# Patient Record
Sex: MALE | Race: WHITE | Employment: FULL TIME | URBAN - METROPOLITAN AREA
[De-identification: names, ages, dates, MRNs, and addresses within clinical notes are randomized per-mention and may not be internally consistent; named-entity substitution may affect disease eponyms.]

---

## 2018-04-18 ENCOUNTER — RECORDS - HEALTHEAST (OUTPATIENT)
Dept: LAB | Facility: CLINIC | Age: 28
End: 2018-04-18

## 2018-04-19 LAB — TSH SERPL DL<=0.005 MIU/L-ACNC: 1.96 UIU/ML (ref 0.3–5)

## 2019-06-12 ENCOUNTER — HOSPITAL ENCOUNTER (INPATIENT)
Facility: CLINIC | Age: 29
LOS: 11 days | Discharge: HOME | DRG: 885 | End: 2019-06-24
Attending: PSYCHIATRY & NEUROLOGY | Admitting: PSYCHIATRY & NEUROLOGY
Payer: COMMERCIAL

## 2019-06-12 DIAGNOSIS — F33.2 MDD (MAJOR DEPRESSIVE DISORDER), RECURRENT SEVERE, WITHOUT PSYCHOSIS (H): ICD-10-CM

## 2019-06-12 DIAGNOSIS — F31.81 BIPOLAR 2 DISORDER (H): Primary | ICD-10-CM

## 2019-06-12 DIAGNOSIS — R45.851 SUICIDAL IDEATION: ICD-10-CM

## 2019-06-12 DIAGNOSIS — F17.200 NICOTINE USE DISORDER: ICD-10-CM

## 2019-06-12 DIAGNOSIS — F10.10 ALCOHOL ABUSE, EPISODIC DRINKING BEHAVIOR: ICD-10-CM

## 2019-06-12 LAB
AMPHETAMINES UR QL SCN: NEGATIVE
BARBITURATES UR QL: NEGATIVE
BENZODIAZ UR QL: NEGATIVE
CANNABINOIDS UR QL SCN: POSITIVE
COCAINE UR QL: NEGATIVE
ETHANOL UR QL SCN: NEGATIVE
OPIATES UR QL SCN: NEGATIVE

## 2019-06-12 PROCEDURE — 80320 DRUG SCREEN QUANTALCOHOLS: CPT | Performed by: FAMILY MEDICINE

## 2019-06-12 PROCEDURE — 99285 EMERGENCY DEPT VISIT HI MDM: CPT | Mod: 25 | Performed by: PSYCHIATRY & NEUROLOGY

## 2019-06-12 PROCEDURE — 99285 EMERGENCY DEPT VISIT HI MDM: CPT | Mod: Z6 | Performed by: PSYCHIATRY & NEUROLOGY

## 2019-06-12 PROCEDURE — 80307 DRUG TEST PRSMV CHEM ANLYZR: CPT | Performed by: FAMILY MEDICINE

## 2019-06-12 ASSESSMENT — ENCOUNTER SYMPTOMS
RESPIRATORY NEGATIVE: 1
CARDIOVASCULAR NEGATIVE: 1
ENDOCRINE NEGATIVE: 1
GASTROINTESTINAL NEGATIVE: 1
DECREASED CONCENTRATION: 1
MUSCULOSKELETAL NEGATIVE: 1
NEUROLOGICAL NEGATIVE: 1
HALLUCINATIONS: 0
EYES NEGATIVE: 1
NERVOUS/ANXIOUS: 1
ACTIVITY CHANGE: 1
HEMATOLOGIC/LYMPHATIC NEGATIVE: 1

## 2019-06-12 ASSESSMENT — MIFFLIN-ST. JEOR: SCORE: 1768.2

## 2019-06-13 PROBLEM — F32.A DEPRESSION WITH SUICIDAL IDEATION: Status: ACTIVE | Noted: 2019-06-13

## 2019-06-13 PROBLEM — R45.851 DEPRESSION WITH SUICIDAL IDEATION: Status: ACTIVE | Noted: 2019-06-13

## 2019-06-13 PROCEDURE — 25000132 ZZH RX MED GY IP 250 OP 250 PS 637: Performed by: PSYCHIATRY & NEUROLOGY

## 2019-06-13 PROCEDURE — 12400001 ZZH R&B MH UMMC

## 2019-06-13 PROCEDURE — H2032 ACTIVITY THERAPY, PER 15 MIN: HCPCS

## 2019-06-13 PROCEDURE — 99223 1ST HOSP IP/OBS HIGH 75: CPT | Mod: AI | Performed by: PSYCHIATRY & NEUROLOGY

## 2019-06-13 RX ORDER — TRAZODONE HYDROCHLORIDE 50 MG/1
50 TABLET, FILM COATED ORAL
Status: DISCONTINUED | OUTPATIENT
Start: 2019-06-13 | End: 2019-06-24 | Stop reason: HOSPADM

## 2019-06-13 RX ORDER — HYDROXYZINE HYDROCHLORIDE 25 MG/1
25 TABLET, FILM COATED ORAL EVERY 4 HOURS PRN
Status: DISCONTINUED | OUTPATIENT
Start: 2019-06-13 | End: 2019-06-24 | Stop reason: HOSPADM

## 2019-06-13 RX ORDER — POLYETHYLENE GLYCOL 3350 17 G
2-4 POWDER IN PACKET (EA) ORAL
Status: DISCONTINUED | OUTPATIENT
Start: 2019-06-13 | End: 2019-06-24 | Stop reason: HOSPADM

## 2019-06-13 RX ORDER — ALUMINA, MAGNESIA, AND SIMETHICONE 2400; 2400; 240 MG/30ML; MG/30ML; MG/30ML
30 SUSPENSION ORAL EVERY 4 HOURS PRN
Status: DISCONTINUED | OUTPATIENT
Start: 2019-06-13 | End: 2019-06-24 | Stop reason: HOSPADM

## 2019-06-13 RX ORDER — LANOLIN ALCOHOL/MO/W.PET/CERES
3 CREAM (GRAM) TOPICAL AT BEDTIME
Status: DISCONTINUED | OUTPATIENT
Start: 2019-06-13 | End: 2019-06-24 | Stop reason: HOSPADM

## 2019-06-13 RX ORDER — OLANZAPINE 10 MG/1
10 TABLET ORAL
Status: DISCONTINUED | OUTPATIENT
Start: 2019-06-13 | End: 2019-06-24 | Stop reason: HOSPADM

## 2019-06-13 RX ORDER — VENLAFAXINE HYDROCHLORIDE 37.5 MG/1
37.5 TABLET, EXTENDED RELEASE ORAL
Status: DISCONTINUED | OUTPATIENT
Start: 2019-06-13 | End: 2019-06-17

## 2019-06-13 RX ORDER — LAMOTRIGINE 25 MG/1
25 TABLET ORAL EVERY EVENING
Status: DISCONTINUED | OUTPATIENT
Start: 2019-06-13 | End: 2019-06-17

## 2019-06-13 RX ORDER — ACETAMINOPHEN 325 MG/1
650 TABLET ORAL EVERY 4 HOURS PRN
Status: DISCONTINUED | OUTPATIENT
Start: 2019-06-13 | End: 2019-06-24 | Stop reason: HOSPADM

## 2019-06-13 RX ORDER — BISACODYL 10 MG
10 SUPPOSITORY, RECTAL RECTAL DAILY PRN
Status: DISCONTINUED | OUTPATIENT
Start: 2019-06-13 | End: 2019-06-24 | Stop reason: HOSPADM

## 2019-06-13 RX ORDER — OLANZAPINE 10 MG/2ML
10 INJECTION, POWDER, FOR SOLUTION INTRAMUSCULAR
Status: DISCONTINUED | OUTPATIENT
Start: 2019-06-13 | End: 2019-06-24 | Stop reason: HOSPADM

## 2019-06-13 RX ADMIN — VENLAFAXINE HYDROCHLORIDE 37.5 MG: 37.5 TABLET, EXTENDED RELEASE ORAL at 13:09

## 2019-06-13 RX ADMIN — HYDROXYZINE HYDROCHLORIDE 25 MG: 25 TABLET ORAL at 09:50

## 2019-06-13 RX ADMIN — NICOTINE POLACRILEX 2 MG: 2 LOZENGE ORAL at 13:08

## 2019-06-13 RX ADMIN — NICOTINE POLACRILEX 4 MG: 2 LOZENGE ORAL at 09:35

## 2019-06-13 RX ADMIN — LAMOTRIGINE 25 MG: 25 TABLET ORAL at 21:19

## 2019-06-13 RX ADMIN — NICOTINE POLACRILEX 4 MG: 2 LOZENGE ORAL at 21:19

## 2019-06-13 RX ADMIN — MELATONIN TAB 3 MG 3 MG: 3 TAB at 21:19

## 2019-06-13 RX ADMIN — NICOTINE POLACRILEX 4 MG: 2 LOZENGE ORAL at 18:11

## 2019-06-13 RX ADMIN — NICOTINE POLACRILEX 2 MG: 2 LOZENGE ORAL at 16:05

## 2019-06-13 ASSESSMENT — ACTIVITIES OF DAILY LIVING (ADL)
DRESS: INDEPENDENT;SCRUBS (BEHAVIORAL HEALTH)
HYGIENE/GROOMING: INDEPENDENT
HYGIENE/GROOMING: INDEPENDENT
BATHING: 0-->INDEPENDENT
AMBULATION: 0-->INDEPENDENT
LAUNDRY: WITH SUPERVISION
TOILETING: 0-->INDEPENDENT
RETIRED_COMMUNICATION: 0-->UNDERSTANDS/COMMUNICATES WITHOUT DIFFICULTY
FALL_HISTORY_WITHIN_LAST_SIX_MONTHS: NO
LAUNDRY: WITH SUPERVISION
DRESS: INDEPENDENT;SCRUBS (BEHAVIORAL HEALTH)
TRANSFERRING: 0-->INDEPENDENT
DRESS: 0-->INDEPENDENT
SWALLOWING: 0-->SWALLOWS FOODS/LIQUIDS WITHOUT DIFFICULTY
ORAL_HYGIENE: INDEPENDENT
COGNITION: 0 - NO COGNITION ISSUES REPORTED
RETIRED_EATING: 0-->INDEPENDENT
ORAL_HYGIENE: INDEPENDENT

## 2019-06-13 ASSESSMENT — MIFFLIN-ST. JEOR
SCORE: 1737.81
SCORE: 1748.24

## 2019-06-13 NOTE — PROGRESS NOTES
Initial Psychosocial Assessment    I have reviewed the chart, met with the patient, and developed Care Plan.     Presenting Problem:  The patient is a 29 year-old with depression who had been reportedly weaving in and out of traffic afraid he would kill himself.  Hx depression since teens and reports a few manic episodes within the last year and racing thoughts the last week. Feeling worthless with poor self esteem but has fairly good insight into what he needs.  Brought to ED by a friend. Six to eight months ago, patient reached out to a physician stating he thought he was Bipolar and that physician put him on a mood stabilizer.  He then became seriously constipated, had colonoscopy and it was decided that Gabapentin may have caused it so stopped it.  Patient has had no psychiatric medications since. Patient is voluntary admission.    Patient has been resting in his room.  Patient reported that he spoke with his employer HR rep so they do know he is hospitalized.  His goal is to get the correct diagnosis and medications to stabilize him.    History of Mental Health and Chemical Dependency:  Smokes THC and binge drinks on occasion but no other illicit drug abuse. History of suicide attempt at age 16.  First psychiatric hospitalization and new patient to [facility]    Family Description (Constellation, Family Psychiatric History):  [place]ent born in [place] and grew up in [place], [place] with his Mom and Stepfather who still live in family home.  Patient has a stepsister María Elena and biological sister Beth who are both older. He is currently single, never  and has no children of issue.      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    Financial Status:  Wages but has financial concerns.    Legal Issues:  None    Ethnic/Cultural Issues:  None    Spiritual Orientation:  Undesignated     Service History:  None    Social Functioning (organization, interests):  Socializing with friends.    Current Treatment Providers are:  No current providers but wants a new psychiatrist in the [place] area.    Social Service Assessment/Plan:  The patient needs a psychiatric assessment and stabilization of his mood and other symptoms.  He will be encouraged to come to staff with any questions or concerns.  He will be encouraged to attend all of the unit programming.  Patient lives in [place] and will need psychiatric provider in [facility] since he works in [employer] and does not want to return to the [facility] system. CTC to ensure that he has a comprehensive care plan in place at discharge.

## 2019-06-13 NOTE — ED NOTES
"\"I have performed an in person assessment of the patient.  Based on this assessment the patient no longer requires a one on one attendant at this point in time.\"       Signed:  Jennie Cortez MD  June 12, 2019 at 9:44 PM       Jennie Cortez MD  06/12/19 2145    "

## 2019-06-13 NOTE — PROGRESS NOTES
"CLINICAL NUTRITION SERVICES - ASSESSMENT NOTE     Nutrition Prescription    RECOMMENDATIONS FOR MDs/PROVIDERS TO ORDER:  None at this time     Malnutrition Status:    Patient does not meet two of the above criteria necessary for diagnosing malnutrition    Recommendations already ordered by Registered Dietitian (RD):  None at this time    Future/Additional Recommendations:  Monitor PO and wt trends     REASON FOR ASSESSMENT  Mathew Lainez is a/an 29 year old male assessed by the dietitian for Admission Nutrition Risk Screen for unintentional loss of 10# or more in the past two months    NUTRITION HISTORY  Mathew reports not being super hungry PTA.   Usually eats 6 small meals. Says he works nights so his eating schedule is irregular. Reports having been a  in the past and can cook well. Reports that the past few months he was exercising more (training for a marathon) and eating healthier food, had intentional ~30 lb weight loss due to these lifestyle changes.   B: oatmeal  Other food: chicken, shrimp, veggies, stir lopez    PMH: Constipation (improved once pt went off medications), episodic alcohol binges, suicidal thoughts    CURRENT NUTRITION ORDERS  Diet: Regular  Intake/Tolerance: Pt reports an ok appetite that is inconsistent     LABS  Labs reviewed    MEDICATIONS  Medications reviewed  Magnesium hydroxide     ANTHROPOMETRICS  Height: 180.3 cm (5' 11\")  Most Recent Weight: 77.1 kg (170 lb)    IBW: 78.2 kg (98%)   BMI: Normal BMI  Weight History: Pt reports intentional weight loss due to lifestyle changes   OSH  6/2/2019- 80 kg  2/20/2018- 83.4 kg     Dosing Weight: 77 kg    ASSESSED NUTRITION NEEDS  Estimated Energy Needs: 8543-0615 kcals/day (25 - 30 kcals/kg)  Justification: Maintenance  Estimated Protein Needs: 62-77 grams protein/day (0.8 - 1 grams of pro/kg)  Justification: Maintenance  Estimated Fluid Needs: (1 mL/kcal)   Justification: Maintenance    PHYSICAL FINDINGS  See malnutrition section " below.    MALNUTRITION  % Intake: Decreased intake does not meet criteria- variable intake   % Weight Loss: Weight loss does not meet criteria- intentional weight loss  Subcutaneous Fat Loss: None observed  Muscle Loss: None observed  Fluid Accumulation/Edema: None noted  Malnutrition Diagnosis: Patient does not meet two of the above criteria necessary for diagnosing malnutrition    NUTRITION DIAGNOSIS  Predicted inadequate nutrient intake (calorie/protein) related to current variable appetite and intake as evidenced by potential for decline.       INTERVENTIONS  Implementation  Nutrition Education: Discussed current appetite and intake. Encouraged intake of meals TID.     Goals  Patient to consume % of nutritionally adequate meal trays TID, or the equivalent with supplements/snacks.     Monitoring/Evaluation  Progress toward goals will be monitored and evaluated per protocol.    Denisse Navarro, Nutrition   Unit pager: [phone]

## 2019-06-13 NOTE — H&P
"[facility], [place]   Psychiatric History & Physical  Admission date: 6/12/2019        Chief Complaint:   \"I've been feeling worse, and noticing it affecting my work         HPI:     Mathew is a 29 year old male with history of depressive mood, alcohol and cannabis use who is admitted on a voluntary basis for worsening depressive moods with suicidal ideation. The patient talks openly about his symptoms, and describes ongoing feelings of sustained low moods, worthlessness, self critical thoughts, apathy, anergy and worsening suicidal ideation. Reports indicate that the patient had reportedly was weaving in and out of traffic and was worried that he was going to kill himself, which lead him to get inpatient help for his symptoms. The patient works, but has found it difficult to function effectively with poor focus and attention, and recurrent ruminations on negative self thoughts. He also has experienced times where he is uncharacteristically elevated in his thoughts, along with episodes of impulsive behavior, with fast moving thoughts. He has tried psychotropic medications in the past, but lead to unwanted side effects (sexual side effects and constipation apparently). He admits to going on episodic alcohol binges that last a day or two along with using cannabis daily to help him find some temporary relief from his symptoms. He currently is open to medications for his symptoms.         Past Psychiatric History:   Past inpatient treatment: No prior hospitalization history.   Past medications: Gabapentin (has intense drowsiness), Prozac (didn't help depression and had sexual side effects), and Wellbutrin (not sure if it helped him much).   Current outpatient psychiatrist: None  Current outpatient therapist: None   Other (ACT team etc): None  Past suicide attempts: Denies  History of commitments: None  History of ECT: None         Substance Use and History:     Patient uses cannabis about 5-6 " "times per week, sometimes daily. Occasionally uses alcohl 1-2 times per week. Has a DUI in 2013. Used to go to AA meetings in the past.         Past Medical History:   PAST MEDICAL HISTORY:   Past Medical History:   Diagnosis Date     Anemia      Bipolar 1 disorder (H)        PAST SURGICAL HISTORY:   Past Surgical History:   Procedure Laterality Date     HC CREATE EARDRUM OPENING,GEN ANESTH  1998     HC REMOVAL ADENOIDS,SECOND,<11 Y/O  1998     HC REMOVAL OF TONSILS,<11 Y/O  1990             Family History:   FAMILY HISTORY:   Family History   Problem Relation Age of Onset     C.A.D. Maternal Grandfather         MI     Cancer Sister         brain cancer           Social History:   SOCIAL HISTORY:   Social History     Tobacco Use     Smoking status: Current Every Day Smoker     Packs/day: 2.00     Smokeless tobacco: Never Used   Substance Use Topics     Alcohol use: Yes     Comment: rare            Physical ROS:   The patient's 10-point review of systems was negative except as noted in HPI.         PTA Medications:     No medications prior to admission.          Allergies:     Allergies   Allergen Reactions     No Known Drug Allergies           Labs:     Recent Results (from the past 48 hour(s))   Drug abuse screen 6 urine (tox)    Collection Time: 06/12/19  8:14 PM   Result Value Ref Range    Amphetamine Qual Urine Negative NEG^Negative    Barbiturates Qual Urine Negative NEG^Negative    Benzodiazepine Qual Urine Negative NEG^Negative    Cannabinoids Qual Urine Positive (A) NEG^Negative    Cocaine Qual Urine Negative NEG^Negative    Ethanol Qual Urine Negative NEG^Negative    Opiates Qualitative Urine Negative NEG^Negative          Physical and Psychiatric Examination:     /68   Pulse 55   Temp 96.6  F (35.9  C) (Tympanic)   Resp 16   Ht 1.803 m (5' 11\")   Wt 76.1 kg (167 lb 11.2 oz)   SpO2 98%   BMI 23.39 kg/m    Weight is 167 lbs 11.2 oz  Body mass index is 23.39 kg/m .    Physical Exam:  I have " "reviewed the physical exam as documented by ED provider and agree with findings and assessment and have no additional findings to add at this time.    Mental Status Exam:  Appearance: White male. Moderately well groomed. In hospital scrubs. No acute distress.   Attitude:  cooperative  Eye Contact:  poor   Mood:  depressed  Affect:  intensity is blunted  Speech:  clear, coherent  Language: fluent and intact in English  Psychomotor, Gait, Musculoskeletal:  no evidence of tardive dyskinesia, dystonia, or tics  Throught Process:  logical, linear and goal oriented  Associations:  no loose associations  Thought Content:  no evidence of suicidal ideation or homicidal ideation, no evidence of psychotic thought, no auditory hallucinations present and no visual hallucinations present  Insight:  fair  Judgement:  fair  Oriented to:  time, person, and place  Attention Span and Concentration:  fair  Recent and Remote Memory:  fair  Fund of Knowledge:  appropriate         Admission Diagnoses:      MDD (major depressive disorder), recurrent severe, without psychosis vs. Bipolar 2 Disorder   Cannabis Use Disorder  R/o Alcohol Use Disorder          Assessment & Plan:     Assessment:  Patient appears to have worsening depressive symptoms with suicidality. He has not been on psychotropic medications and has been \"self medicating\" with frequent cannabis and occasional episodic binges of alcohol use. Along with depression, he also seems to have some symptoms of hypomania, with depression being the main component (with anergy, apathy, avolition, and negative self ruminations being primary symptoms). We discussed medications to help with some of these symptoms, including Effexor and Lamictal, both which he was open to take.      Plan:  1.) Medication Plan:  - Effexor 37.5 mg   - Lamictal 25 mg  - Melatonin 3 mg HS    2.) Psychosocial Plan:  - Patient could benefit from individual therapy. Will also discuss partial/day program options. "     Legal:  Voluntary     Disposition:  Likely discharge in 2-3 days        Jagdeep Soto MD  [facility] Psychiatry

## 2019-06-13 NOTE — ED NOTES
"ED to Behavioral Floor Handoff    SITUATION  Mathew Lainez is a 29 year old male who speaks English and lives in a home alone The patient arrived in the ED by private car from home with a complaint of Suicidal (suicidal thoughts and started arranging finacial plan for his friend. Plan to cut his wrist or OD on Ibuprofen and vodka. Pt stopped taking his meds about 6 months ago)  .The patient's current symptoms started/worsened 1 week(s) ago and during this time the symptoms have increased.   In the ED, pt was diagnosed with   Final diagnoses:   MDD (major depressive disorder), recurrent severe, without psychosis (H)   Suicidal ideation   Alcohol abuse, episodic drinking behavior        Initial vitals were: BP: 145/80  Pulse: 103  Temp: 98.2  F (36.8  C)  Height: 180.3 cm (5' 11\")  Weight: 79.1 kg (174 lb 6.4 oz)  SpO2: 96 %   --------  Is the patient diabetic? No   If yes, last blood glucose? --     If yes, was this treated in the ED? --  --------  Is the patient inebriated (ETOH) No or Impaired on other substances? No  MSSA done? N/A  Last MSSA score: --    Were withdrawal symptoms treated? N/A  Does the patient have a seizure history? No. If yes, date of most recent seizure--  --------  Is the patient patient experiencing suicidal ideation? reports the following suicide factors: thoughts and plan to OD or drive car into traffic    Homicidal ideation? denies current or recent homicidal ideation or behaviors.    Self-injurious behavior/urges? denies current or recent self injurious behavior or ideation.  ------  Was pt aggressive in the ED No  Was a code called No  Is the pt now cooperative? Yes  -------  Meds given in ED: Medications - No data to display   Family present during ED course? No  Family currently present? No    BACKGROUND  Does the patient have a cognitive impairment or developmental disability? No  Allergies:   Allergies   Allergen Reactions     No Known Drug Allergies    .   Social demographics " "are   Social History     Socioeconomic History     Marital status: Single     Spouse name: None     Number of children: None     Years of education: None     Highest education level: None   Occupational History     None   Social Needs     Financial resource strain: None     Food insecurity:     Worry: None     Inability: None     Transportation needs:     Medical: None     Non-medical: None   Tobacco Use     Smoking status: Current Every Day Smoker     Packs/day: 2.00     Smokeless tobacco: Never Used   Substance and Sexual Activity     Alcohol use: Yes     Comment: rare     Drug use: Yes     Types: Marijuana     Sexual activity: None   Lifestyle     Physical activity:     Days per week: None     Minutes per session: None     Stress: None   Relationships     Social connections:     Talks on phone: None     Gets together: None     Attends Hindu service: None     Active member of club or organization: None     Attends meetings of clubs or organizations: None     Relationship status: None     Intimate partner violence:     Fear of current or ex partner: None     Emotionally abused: None     Physically abused: None     Forced sexual activity: None   Other Topics Concern     None   Social History Narrative     None        ASSESSMENT  Labs results   Labs Ordered and Resulted from Time of ED Arrival Up to the Time of Departure from the ED   DRUG ABUSE SCREEN 6 CHEM DEP URINE ([facility]) - Abnormal; Notable for the following components:       Result Value    Cannabinoids Qual Urine Positive (*)     All other components within normal limits      Imaging Studies: No results found for this or any previous visit (from the past 24 hour(s)).   Most recent vital signs /80   Pulse 103   Temp 98.2  F (36.8  C)   Ht 1.803 m (5' 11\")   Wt 79.1 kg (174 lb 6.4 oz)   SpO2 96%   BMI 24.32 kg/m     Abnormal labs/tests/findings requiring intervention:---   Pain control: pt had none  Nausea control: pt had " none    RECOMMENDATION  Are any infection precautions needed (MRSA, VRE, etc.)? No If yes, what infection? --  ---  Does the patient have mobility issues? independently. If yes, what device does the pt use? ---  ---  Is patient on 72 hour hold or commitment? No If on 72 hour hold, have hold and rights been given to patient? No  Are admitting orders written if after 10 p.m. ?N/A  Tasks needing to be completed:---     Marge yoon-- [phone] [facility]   [phone] [facility]

## 2019-06-13 NOTE — PROGRESS NOTES
06/13/19 0056   Patient Belongings   Did you bring any home meds/supplements to the hospital?  No   Patient Belongings locker;sent to security per site process   Patient Belongings Put in Hospital Secure Location (Security or Locker, etc.) other (see comments)   Belongings Search Yes   Clothing Search Yes   Second Staff FORREST     In locker:  Shirt  Pair of pants  Phone  Sweater  Pair of shoes    Set of keys  Pair of ear buds  Wallet  Pair of underwear    To security:  Isaiah Mora - [phone]  $1 cash    A               Admission:  I am responsible for any personal items that are not sent to the safe or pharmacy.  [facility] is not responsible for loss, theft or damage of any property in my possession.    Signature:  _________________________________ Date: _______  Time: _____                                              Staff Signature:  ____________________________ Date: ________  Time: _____      2nd Staff person, if patient is unable/unwilling to sign:    Signature: ________________________________ Date: ________  Time: _____     Discharge:  [facility] has returned all of my personal belongings:    Signature: _________________________________ Date: ________  Time: _____                                          Staff Signature:  ____________________________ Date: ________  Time: _____

## 2019-06-13 NOTE — ED PROVIDER NOTES
History     Chief Complaint   Patient presents with     Suicidal     suicidal thoughts and started arranging finacial plan for his friend. Plan to cut his wrist or OD on Ibuprofen and vodka. Pt stopped taking his meds about 6 months ago     HPI  Mathew Lainez is a 29 year old male who is here brought by a friend whom he called as he was feeling suicidal and scared that he will kill himself by swerving into incoming traffic. Patient reports a long history of depression. He saw a specialist who felt he may be bipolar and started him on mood stabilizers. Patient tried the meds for couple years but did not see any benefit. He was battling horrible constipation and had multiple work-ups with colonoscopies and no medical etiology could be found. They felt it was medication related, likely due to gabapentin. Patient decided to go off his meds. His constipation improved. He has not been on any psychotropics for 6-8 months. He reports feeling more depressed past couple months. Patient continues to work. He shares an apartment with a roommate in [place]. He admits to going on episodic alcohol binges. He last used 1 week ago. He occasionally smokes THC. He denies using other drugs. He has no acute medical concerns. He had driven from work in [employer] to [place] today. He was visiting the [place] when he felt extremely depressed and had urges to drive into oncoming traffic. He was afraid that other would get hurt and decided to call his friend for help. He continues to feel unsafe. He has not been hospitalized previously. He would like to voluntarily check himself in.    There is family history of suicide with grandmother and great uncle.    PERSONAL MEDICAL HISTORY  Past Medical History:   Diagnosis Date     Anemia      Bipolar 1 disorder (H)      PAST SURGICAL HISTORY  Past Surgical History:   Procedure Laterality Date     HC CREATE EARDRUM OPENING,GEN ANESTH  1998     HC REMOVAL ADENOIDS,SECOND,<13 Y/O  1998  "    HC REMOVAL OF TONSILS,<11 Y/O  1990     FAMILY HISTORY  Family History   Problem Relation Age of Onset     C.A.D. Maternal Grandfather         MI     Cancer Sister         brain cancer     SOCIAL HISTORY  Social History     Tobacco Use     Smoking status: Current Every Day Smoker     Packs/day: 2.00     Smokeless tobacco: Never Used   Substance Use Topics     Alcohol use: Yes     Comment: rare     MEDICATIONS  No current facility-administered medications for this encounter.      No current outpatient medications on file.     ALLERGIES  Allergies   Allergen Reactions     No Known Drug Allergies          I have reviewed the Medications, Allergies, Past Medical and Surgical History, and Social History in the Epic system.    Review of Systems   Constitutional: Positive for activity change.   HENT: Negative.    Eyes: Negative.    Respiratory: Negative.    Cardiovascular: Negative.    Gastrointestinal: Negative.    Endocrine: Negative.    Genitourinary: Negative.    Musculoskeletal: Negative.    Skin: Negative.    Neurological: Negative.    Hematological: Negative.    Psychiatric/Behavioral: Positive for decreased concentration and suicidal ideas. Negative for hallucinations. The patient is nervous/anxious.    All other systems reviewed and are negative.      Physical Exam   BP: 145/80  Pulse: 103  Temp: 98.2  F (36.8  C)  Height: 180.3 cm (5' 11\")  Weight: 79.1 kg (174 lb 6.4 oz)  SpO2: 96 %      Physical Exam   Constitutional: He appears well-developed and well-nourished.   HENT:   Head: Normocephalic.   Eyes: Pupils are equal, round, and reactive to light.   Neck: Normal range of motion.   Cardiovascular: Normal rate.   Pulmonary/Chest: Effort normal.   Abdominal: Soft.   Musculoskeletal: Normal range of motion.   Neurological: He is alert.   Skin: Skin is warm.   Psychiatric: His speech is normal and behavior is normal. Judgment normal. His mood appears anxious. He is not actively hallucinating. Thought content is " not paranoid and not delusional. Cognition and memory are normal. He exhibits a depressed mood. He expresses suicidal ideation. He expresses no homicidal ideation. He expresses suicidal plans.   Nursing note and vitals reviewed.      ED Course        Procedures               Labs Ordered and Resulted from Time of ED Arrival Up to the Time of Departure from the ED   DRUG ABUSE SCREEN 6 CHEM DEP URINE ([facility]) - Abnormal; Notable for the following components:       Result Value    Cannabinoids Qual Urine Positive (*)     All other components within normal limits            Assessments & Plan (with Medical Decision Making)   Patient with recurrent MDD who is feeling acutely suicidal. He was thinking of killing himself by driving into traffic. He continues to feel unsafe. He is referred for admission. He is voluntary.    I have reviewed the nursing notes.    I have reviewed the findings, diagnosis, plan and need for follow up with the patient.       Medication List      There are no discharge medications for this visit.         Final diagnoses:   MDD (major depressive disorder), recurrent severe, without psychosis (H)   Suicidal ideation   Alcohol abuse, episodic drinking behavior       6/12/2019   [facility], [place], EMERGENCY DEPARTMENT     Sarkis Hanson MD  06/12/19 0762

## 2019-06-13 NOTE — PLAN OF CARE
BEHAVIORAL TEAM DISCUSSION    Participants: Dr. Jagdeep Soto; Gloria Jorgensen;  Suzanna CHE; Maria Luz Fisher OTR/L    Progress: The patient resting I his room. Cooperative with care. Requesting to go back on his medications;    Continued Stay Criteria/Rationale: New admit    Medical/Physical: Colonoscopy and constipation    Precautions:   Behavioral Orders   Procedures    Code 1 - Restrict to Unit    Routine Programming     As clinically indicated    Status 15     Every 15 minutes.    Suicide precautions     Patients on Suicide Precautions should have a Combination Diet ordered that includes a Diet selection(s) AND a Behavioral Tray selection for Safe Tray - with utensils, or Safe Tray - NO utensils      Withdrawal precautions     Plan: Patient needs psychiatric assessment, medications restarted and med management.  Stabilization of his mood and  Other symptoms.  He will be encouraged to attend all unit programming. CTC to ensure that he has a comprehensive care plan in place to include medication management at discharge.    Rationale for change in precautions or plan: No changes
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
## 2019-06-14 LAB
ALBUMIN SERPL-MCNC: 4.1 G/DL (ref 3.4–5)
ALP SERPL-CCNC: 60 U/L (ref 40–150)
ALT SERPL W P-5'-P-CCNC: 26 U/L (ref 0–70)
ANION GAP SERPL CALCULATED.3IONS-SCNC: 7 MMOL/L (ref 3–14)
AST SERPL W P-5'-P-CCNC: 19 U/L (ref 0–45)
BASOPHILS # BLD AUTO: 0 10E9/L (ref 0–0.2)
BASOPHILS NFR BLD AUTO: 0.5 %
BILIRUB SERPL-MCNC: 0.9 MG/DL (ref 0.2–1.3)
BUN SERPL-MCNC: 15 MG/DL (ref 7–30)
CALCIUM SERPL-MCNC: 8.6 MG/DL (ref 8.5–10.1)
CHLORIDE SERPL-SCNC: 107 MMOL/L (ref 94–109)
CO2 SERPL-SCNC: 25 MMOL/L (ref 20–32)
CREAT SERPL-MCNC: 0.99 MG/DL (ref 0.66–1.25)
DIFFERENTIAL METHOD BLD: NORMAL
EOSINOPHIL # BLD AUTO: 0.2 10E9/L (ref 0–0.7)
EOSINOPHIL NFR BLD AUTO: 3.2 %
ERYTHROCYTE [DISTWIDTH] IN BLOOD BY AUTOMATED COUNT: 11.8 % (ref 10–15)
GFR SERPL CREATININE-BSD FRML MDRD: >90 ML/MIN/{1.73_M2}
GLUCOSE SERPL-MCNC: 88 MG/DL (ref 70–99)
HCT VFR BLD AUTO: 47.5 % (ref 40–53)
HGB BLD-MCNC: 16.6 G/DL (ref 13.3–17.7)
IMM GRANULOCYTES # BLD: 0 10E9/L (ref 0–0.4)
IMM GRANULOCYTES NFR BLD: 0 %
LYMPHOCYTES # BLD AUTO: 2.3 10E9/L (ref 0.8–5.3)
LYMPHOCYTES NFR BLD AUTO: 31 %
MCH RBC QN AUTO: 32.1 PG (ref 26.5–33)
MCHC RBC AUTO-ENTMCNC: 34.9 G/DL (ref 31.5–36.5)
MCV RBC AUTO: 92 FL (ref 78–100)
MONOCYTES # BLD AUTO: 0.6 10E9/L (ref 0–1.3)
MONOCYTES NFR BLD AUTO: 8.5 %
NEUTROPHILS # BLD AUTO: 4.3 10E9/L (ref 1.6–8.3)
NEUTROPHILS NFR BLD AUTO: 56.8 %
NRBC # BLD AUTO: 0 10*3/UL
NRBC BLD AUTO-RTO: 0 /100
PLATELET # BLD AUTO: 213 10E9/L (ref 150–450)
POTASSIUM SERPL-SCNC: 4.2 MMOL/L (ref 3.4–5.3)
PROT SERPL-MCNC: 7.5 G/DL (ref 6.8–8.8)
RBC # BLD AUTO: 5.17 10E12/L (ref 4.4–5.9)
SODIUM SERPL-SCNC: 139 MMOL/L (ref 133–144)
TSH SERPL DL<=0.005 MIU/L-ACNC: 0.46 MU/L (ref 0.4–4)
WBC # BLD AUTO: 7.5 10E9/L (ref 4–11)

## 2019-06-14 PROCEDURE — 90853 GROUP PSYCHOTHERAPY: CPT

## 2019-06-14 PROCEDURE — 25000132 ZZH RX MED GY IP 250 OP 250 PS 637: Performed by: PSYCHIATRY & NEUROLOGY

## 2019-06-14 PROCEDURE — 80053 COMPREHEN METABOLIC PANEL: CPT | Performed by: PSYCHIATRY & NEUROLOGY

## 2019-06-14 PROCEDURE — 12400001 ZZH R&B MH UMMC

## 2019-06-14 PROCEDURE — 84443 ASSAY THYROID STIM HORMONE: CPT | Performed by: PSYCHIATRY & NEUROLOGY

## 2019-06-14 PROCEDURE — 85025 COMPLETE CBC W/AUTO DIFF WBC: CPT | Performed by: PSYCHIATRY & NEUROLOGY

## 2019-06-14 PROCEDURE — 36415 COLL VENOUS BLD VENIPUNCTURE: CPT | Performed by: PSYCHIATRY & NEUROLOGY

## 2019-06-14 PROCEDURE — 99231 SBSQ HOSP IP/OBS SF/LOW 25: CPT | Performed by: PSYCHIATRY & NEUROLOGY

## 2019-06-14 RX ADMIN — ALUMINUM HYDROXIDE, MAGNESIUM HYDROXIDE, AND DIMETHICONE 30 ML: 400; 400; 40 SUSPENSION ORAL at 18:49

## 2019-06-14 RX ADMIN — LAMOTRIGINE 25 MG: 25 TABLET ORAL at 22:10

## 2019-06-14 RX ADMIN — MAGNESIUM HYDROXIDE 30 ML: 400 SUSPENSION ORAL at 22:24

## 2019-06-14 RX ADMIN — MELATONIN TAB 3 MG 3 MG: 3 TAB at 22:10

## 2019-06-14 RX ADMIN — NICOTINE POLACRILEX 4 MG: 2 LOZENGE ORAL at 16:57

## 2019-06-14 RX ADMIN — NICOTINE POLACRILEX 4 MG: 2 LOZENGE ORAL at 08:57

## 2019-06-14 RX ADMIN — NICOTINE POLACRILEX 2 MG: 2 LOZENGE ORAL at 11:10

## 2019-06-14 RX ADMIN — NICOTINE POLACRILEX 4 MG: 2 LOZENGE ORAL at 18:47

## 2019-06-14 RX ADMIN — VENLAFAXINE HYDROCHLORIDE 37.5 MG: 37.5 TABLET, EXTENDED RELEASE ORAL at 08:15

## 2019-06-14 RX ADMIN — NICOTINE POLACRILEX 4 MG: 2 LOZENGE ORAL at 15:09

## 2019-06-14 RX ADMIN — NICOTINE POLACRILEX 4 MG: 2 LOZENGE ORAL at 21:24

## 2019-06-14 ASSESSMENT — ACTIVITIES OF DAILY LIVING (ADL)
DRESS: INDEPENDENT
ORAL_HYGIENE: INDEPENDENT
LAUNDRY: WITH SUPERVISION
HYGIENE/GROOMING: INDEPENDENT

## 2019-06-14 NOTE — PROGRESS NOTES
Patient is calm and cooperative. He has been walking the halls, going to groups, eating his food, sleeping okay and feeling a little better. His goal for the day is to call HR at 2 for his work situation. His meds are making him feel foggy in the morning but it isnt too bad. He denies SI/SIB. Rated depression 5 and anxiety 6. He had a good phone call with his mom about his family mental health history and he feels much better after that. He is doing pretty well and looks forward to a friend visiting him tonight. NO other concerns at this time.

## 2019-06-14 NOTE — PROGRESS NOTES
The patient wants a provider in the [facility] since his lives in [place] and works in [employer].  (Does not want to return to [employer] in [place]!)  At the moment, all of the psychiatrist a [facility], [facility] and [facility] are booking out AND patient has an [employer] managed [employer] Plan so this severely limits who is credentialled under his insurance.  [facility] and [facility] were booking psychiatry appts out until September.  I was able to secure an 8/5/10 9am appt for patient with CNS Gillian Jose at [facility] in [place] and he is on the wait-list where they will text him if there is a cancellation prior to that date.  Dr. Soto could give him refills as will to carry him to 8/5/19 if he agrees to do so.  Patient is waiting for disabilty information from his employe's ([employer] in [employer]) disability carrier [employer].  They were suppose fax paperwork to Mathew today.  If not, Mathew will call on Monday and request the paperwork then.
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
## 2019-06-14 NOTE — PROGRESS NOTES
06/14/19 0831   General Information   Has Not Attended OT as of: 06/14/19     OT staff will meet with pt to review the role of occupational therapy and explain the value of having them involved in their treatment plan including options to meet current needs/self-identified goals. As group attendance is established, Pt will be given self-assessment to inform OT initial assessment.

## 2019-06-14 NOTE — PROGRESS NOTES
"[facility], [place]   Psychiatric Progress Note        Interim History:   Reason for Hospitalization:Mathew is a 29 year old male with history of depressive mood, alcohol and cannabis use who is admitted on a voluntary basis for worsening depressive moods with suicidal ideation.    Subjective: \"Doing ok, trying to learn things in group, and hopefully get on the right meds to help me\"     As per today's interview: Patient was pleasant and somewhat anxious up on approach. He organized a list of questions he wanted to ask me, which we went over for some time. He had some mild \"spaced out\" feelings after taking the medications but nothing intolerable. Denied current suicidal ideation, intent or plan. Remains future oriented. Hopes to take some time off work to focus on his mental health.          Medications:       lamoTRIgine  25 mg Oral QPM     melatonin  3 mg Oral At Bedtime     venlafaxine  37.5 mg Oral Daily with breakfast          Allergies:     Allergies   Allergen Reactions     No Known Drug Allergies           Labs:     Recent Results (from the past 48 hour(s))   Drug abuse screen 6 urine (tox)    Collection Time: 06/12/19  8:14 PM   Result Value Ref Range    Amphetamine Qual Urine Negative NEG^Negative    Barbiturates Qual Urine Negative NEG^Negative    Benzodiazepine Qual Urine Negative NEG^Negative    Cannabinoids Qual Urine Positive (A) NEG^Negative    Cocaine Qual Urine Negative NEG^Negative    Ethanol Qual Urine Negative NEG^Negative    Opiates Qualitative Urine Negative NEG^Negative   CBC with platelets differential    Collection Time: 06/14/19  7:27 AM   Result Value Ref Range    WBC 7.5 4.0 - 11.0 10e9/L    RBC Count 5.17 4.4 - 5.9 10e12/L    Hemoglobin 16.6 13.3 - 17.7 g/dL    Hematocrit 47.5 40.0 - 53.0 %    MCV 92 78 - 100 fl    MCH 32.1 26.5 - 33.0 pg    MCHC 34.9 31.5 - 36.5 g/dL    RDW 11.8 10.0 - 15.0 %    Platelet Count 213 150 - 450 10e9/L    Diff Method Automated " "Method     % Neutrophils 56.8 %    % Lymphocytes 31.0 %    % Monocytes 8.5 %    % Eosinophils 3.2 %    % Basophils 0.5 %    % Immature Granulocytes 0.0 %    Nucleated RBCs 0 0 /100    Absolute Neutrophil 4.3 1.6 - 8.3 10e9/L    Absolute Lymphocytes 2.3 0.8 - 5.3 10e9/L    Absolute Monocytes 0.6 0.0 - 1.3 10e9/L    Absolute Eosinophils 0.2 0.0 - 0.7 10e9/L    Absolute Basophils 0.0 0.0 - 0.2 10e9/L    Abs Immature Granulocytes 0.0 0 - 0.4 10e9/L    Absolute Nucleated RBC 0.0    Comprehensive metabolic panel    Collection Time: 06/14/19  7:27 AM   Result Value Ref Range    Sodium 139 133 - 144 mmol/L    Potassium 4.2 3.4 - 5.3 mmol/L    Chloride 107 94 - 109 mmol/L    Carbon Dioxide 25 20 - 32 mmol/L    Anion Gap 7 3 - 14 mmol/L    Glucose 88 70 - 99 mg/dL    Urea Nitrogen 15 7 - 30 mg/dL    Creatinine 0.99 0.66 - 1.25 mg/dL    GFR Estimate >90 >60 mL/min/[1.73_m2]    GFR Estimate If Black >90 >60 mL/min/[1.73_m2]    Calcium 8.6 8.5 - 10.1 mg/dL    Bilirubin Total 0.9 0.2 - 1.3 mg/dL    Albumin 4.1 3.4 - 5.0 g/dL    Protein Total 7.5 6.8 - 8.8 g/dL    Alkaline Phosphatase 60 40 - 150 U/L    ALT 26 0 - 70 U/L    AST 19 0 - 45 U/L   TSH with free T4 reflex and/or T3 as indicated    Collection Time: 06/14/19  7:27 AM   Result Value Ref Range    TSH 0.46 0.40 - 4.00 mU/L          Psychiatric Examination:   /68   Pulse 55   Temp 97.7  F (36.5  C)   Resp 16   Ht 1.803 m (5' 11\")   Wt 76.1 kg (167 lb 11.2 oz)   SpO2 98%   BMI 23.39 kg/m    Weight is 167 lbs 11.2 oz  Body mass index is 23.39 kg/m .    Appearance: White male. Moderately well groomed. In hospital scrubs. No acute distress.   Attitude:  cooperative  Eye Contact:  Intermittent    Mood:  depressed  Affect:  intensity is blunted  Speech:  clear, coherent  Language: fluent and intact in English  Psychomotor, Gait, Musculoskeletal:  no evidence of tardive dyskinesia, dystonia, or tics  Throught Process:  logical, linear and goal oriented  Associations:  " "no loose associations  Thought Content:  no evidence of suicidal ideation or homicidal ideation, no evidence of psychotic thought, no auditory hallucinations present and no visual hallucinations present  Insight:  fair  Judgement:  fair  Oriented to:  time, person, and place  Attention Span and Concentration:  fair  Recent and Remote Memory:  fair  Fund of Knowledge:  appropriate      Assessment & Plan       Principal Diagnosis:   MDD (major depressive disorder), recurrent severe, without psychosis vs. Bipolar 2 Disorder   Cannabis Use Disorder  R/o Alcohol Use Disorder     Assessment:   (Initial Assessment): Patient appears to have worsening depressive symptoms with suicidality. He has not been on psychotropic medications and has been \"self medicating\" with frequent cannabis and occasional episodic binges of alcohol use. Along with depression, he also seems to have some symptoms of hypomania, with depression being the main component (with anergy, apathy, avolition, and negative self ruminations being primary symptoms). We discussed medications to help with some of these symptoms, including Effexor and Lamictal, both which he was open to take.      Update 6/14: Patient has been attending groups, and taking his medications. He talks about some some mild dissociative like events that occur at various times during the day, but does not seem anything wholly worrisome. He has continued depressive moods, but overall future oriented, pleasant ,and is willing to take any recommendations for      Plan:  1.) Medication Plan:  - Effexor 37.5 mg   - Lamictal 25 mg  - Melatonin 3 mg HS     2.) Psychosocial Plan:  - Patient could benefit from individual therapy. Will also discuss partial/day program options.   - Discuss possible partial/day program options.      Legal:  Voluntary      Disposition:  Likely discharge in 2-3 days     Safety Assessment:   Checks: Status 15  Precautions: Suicide  Pt has not required locked seclusion " or restraints in the past 24 hours to maintain safety, please refer to RN documentation for further details.       The risks, benefits, alternatives and side effects have been discussed and are understood by the patient and other caregivers.         Attestation:  Patient has been seen and evaluated by me,  Jagdeep Soto MD

## 2019-06-14 NOTE — PROGRESS NOTES
"Participated in Music Therapy group with focus on mood elevation, validation and decreasing anxiety and improved group cohesiveness. Engaged and cooperative in music listening interventions.   Showed progress in session goals.  Started group stating he was feeling a \"2-3\" on a mood scale of 1-10.  During session he stated he was \"feeling a little bit better.\"    "

## 2019-06-15 PROCEDURE — 12400001 ZZH R&B MH UMMC

## 2019-06-15 PROCEDURE — 25000132 ZZH RX MED GY IP 250 OP 250 PS 637: Performed by: PSYCHIATRY & NEUROLOGY

## 2019-06-15 RX ADMIN — NICOTINE POLACRILEX 4 MG: 2 LOZENGE ORAL at 14:46

## 2019-06-15 RX ADMIN — MAGNESIUM HYDROXIDE 30 ML: 400 SUSPENSION ORAL at 21:43

## 2019-06-15 RX ADMIN — HYDROXYZINE HYDROCHLORIDE 25 MG: 25 TABLET ORAL at 14:45

## 2019-06-15 RX ADMIN — NICOTINE POLACRILEX 4 MG: 2 LOZENGE ORAL at 18:04

## 2019-06-15 RX ADMIN — NICOTINE POLACRILEX 4 MG: 2 LOZENGE ORAL at 21:39

## 2019-06-15 RX ADMIN — MELATONIN TAB 3 MG 3 MG: 3 TAB at 21:39

## 2019-06-15 RX ADMIN — LAMOTRIGINE 25 MG: 25 TABLET ORAL at 21:39

## 2019-06-15 RX ADMIN — NICOTINE POLACRILEX 4 MG: 2 LOZENGE ORAL at 12:32

## 2019-06-15 RX ADMIN — NICOTINE POLACRILEX 4 MG: 2 LOZENGE ORAL at 19:34

## 2019-06-15 RX ADMIN — VENLAFAXINE HYDROCHLORIDE 37.5 MG: 37.5 TABLET, EXTENDED RELEASE ORAL at 08:31

## 2019-06-15 RX ADMIN — NICOTINE POLACRILEX 4 MG: 2 LOZENGE ORAL at 08:32

## 2019-06-15 RX ADMIN — NICOTINE POLACRILEX 4 MG: 2 LOZENGE ORAL at 10:50

## 2019-06-15 ASSESSMENT — ACTIVITIES OF DAILY LIVING (ADL)
DRESS: SCRUBS (BEHAVIORAL HEALTH)
ORAL_HYGIENE: INDEPENDENT
HYGIENE/GROOMING: INDEPENDENT
LAUNDRY: UNABLE TO COMPLETE
DRESS: SCRUBS (BEHAVIORAL HEALTH);INDEPENDENT
LAUNDRY: WITH SUPERVISION
ORAL_HYGIENE: INDEPENDENT
HYGIENE/GROOMING: INDEPENDENT

## 2019-06-15 NOTE — PLAN OF CARE
"  Pt visible in milieu. Appears tense and anxious. Pacing. Polite, pleasant, cooperative. Med-compliant. Eating and drinking adequately. Hygiene good. Reports adequate sleep. Reports high levels of anxiety due to concerns over job retention and to submitting proper paperwork to receive short-term disability. However, he states his manager contacted him and conveyed the message that he \"absolutely should stop worrying about it\" and he felt releived that she appears to be helpful and understanding. Reports hopefulness about his situation is now outweighing hopelessness, and he is grateful for the attention he has received from his provider and the staff here. Reports he does not feel actively suicidal at the present time, but still wonders how safe he would be if not in the hospital, and feels like he \"worries about maybe taking that option in the future.\" However, he feels that with the right meds and with adequate follow-up care and social support, he can \"make it.\" Reports mother divulged some family history to him recently which he had not known much detail about previously. Notably that his mother had OCD, ADHD; father was \"maybe narcissistic, or had problems with empathy\"; maternal uncle and grandmother had probably bipolar, and the uncle successfully suicided. States it was helpful information in giving him more insight into his own mental health status. Continue with current treatment plan and recommendations. Continue to monitor and reassess symptoms. Monitor response to medications. Monitor progress towards treatment goals. Encourage groups and participation.   "

## 2019-06-15 NOTE — PROGRESS NOTES
"   06/14/19 1600   Group Therapy Session   Group Attendance attended group session   Total Time (minutes) 45   Group Type psychotherapeutic   Group Topic Covered emotions/expression   Patient Participation/Contribution cooperative with task;discussed personal experience with topic;offered helpful suggestions to peers   Psychotherapy group goal:  Identify and process feelings in-the-moment utilizing a \"feelings heart\" activity. Mathew was actively engaged in the group activities and processing. He reports feeling more optimistic than he felt when he first arrived. He presented in a pleasant mood, full affect.    "

## 2019-06-16 PROCEDURE — 12400001 ZZH R&B MH UMMC

## 2019-06-16 PROCEDURE — 25000132 ZZH RX MED GY IP 250 OP 250 PS 637: Performed by: PSYCHIATRY & NEUROLOGY

## 2019-06-16 RX ADMIN — VENLAFAXINE HYDROCHLORIDE 37.5 MG: 37.5 TABLET, EXTENDED RELEASE ORAL at 09:03

## 2019-06-16 RX ADMIN — LAMOTRIGINE 25 MG: 25 TABLET ORAL at 22:13

## 2019-06-16 RX ADMIN — MAGNESIUM HYDROXIDE 30 ML: 400 SUSPENSION ORAL at 22:13

## 2019-06-16 RX ADMIN — NICOTINE POLACRILEX 4 MG: 2 LOZENGE ORAL at 20:39

## 2019-06-16 RX ADMIN — NICOTINE POLACRILEX 4 MG: 2 LOZENGE ORAL at 22:14

## 2019-06-16 RX ADMIN — NICOTINE POLACRILEX 4 MG: 2 LOZENGE ORAL at 18:48

## 2019-06-16 RX ADMIN — MELATONIN TAB 3 MG 3 MG: 3 TAB at 22:15

## 2019-06-16 RX ADMIN — TRAZODONE HYDROCHLORIDE 50 MG: 50 TABLET ORAL at 22:43

## 2019-06-16 RX ADMIN — NICOTINE POLACRILEX 4 MG: 2 LOZENGE ORAL at 13:04

## 2019-06-16 RX ADMIN — HYDROXYZINE HYDROCHLORIDE 25 MG: 25 TABLET ORAL at 14:09

## 2019-06-16 ASSESSMENT — ACTIVITIES OF DAILY LIVING (ADL)
DRESS: SCRUBS (BEHAVIORAL HEALTH);INDEPENDENT
LAUNDRY: WITH SUPERVISION
ORAL_HYGIENE: INDEPENDENT
HYGIENE/GROOMING: INDEPENDENT

## 2019-06-16 NOTE — PROGRESS NOTES
06/15/19 2107   Behavioral [facility]   Hallucinations denies / not responding to hallucinations   Thinking intact   Orientation person: oriented;place: oriented;date: oriented;time: oriented   Memory baseline memory   Insight insight appropriate to events;insight appropriate to situation   Judgement impaired   Eye Contact at floor;into space;at examiner   Affect full range affect;other (see comments)  (forced smile)   Mood depressed;anxious;mood is calm   Physical Appearance/Attire appears younger than stated age   Hygiene well groomed   Suicidality thoughts only   1. Wish to be Dead Yes   2. Non-Specific Active Suicidal Thoughts  No   3. Active Sucidal Ideation with any Methods (Not Plan) Without Intent to Act  No   4. Active Suicidal Ideation with Some Intent to Act, Without Specific Plan  No   5. Active Suicidal Ideation with Specific Plan and Intent  No   Duration (Lifetime) 4   Change in Protective Factors? No   Enviromental Risk Factors None   Self Injury other (see comment)  (denies)   Elopement   (none)   Activity withdrawn;restless   Speech pressured;clear;coherent   Medication Sensitivity no stated side effects;no observed side effects   Psychomotor / Gait balanced;steady;paces   Daily Care   Activity up ad tobin   Patient Performed Hygiene other (see comments)  (per pt)   Activities of Daily Living   Hygiene/Grooming independent   Oral Hygiene independent   Dress scrubs (behavioral health);independent   Laundry with supervision   Room Organization independent     Pt began shift very anxious and feeling a bit hopeless due to waiting for a diagnosis, but later stated that they were feeling hopeful because they felt our doctors were being more of a help than those being seen prior to admission. Pt at times has a blunted, tense affect but shifts to being smiley when in conversation. The smile seems forced, but the friendliness seems legitimate. Pt spent time socializing with peers and staff, playing [employer]  "during free time. Pt states that \"my anxiety was really high earlier, and I've been feeling depressed, but its gotten a little better tonight.\" Pt is nervous waiting for coming diagnosis but states that they are hopeful they can finally start putting in the work to get better once they have the information. Pt admits to SI, thoughts only, and contracts for safety. Pt denies SIB and hallucinations. Pt states that they have been having a hard time concentrating on the unit due to all the noise and personalities but understands the dynamics of the milieu and has been pleasant. Pt was seen consoling peers who were having a hard time. Pt appears well-groomed and is eating their meals. Pt reports no pain.  "

## 2019-06-16 NOTE — PLAN OF CARE
Pt has been irritable today. Pt spent most of the shift in his room. Ate some breakfast and refused lunch. Pt states the peer sleeping in the lounge is irritating him because he snores and sleeps in the lounge. Affect and mood irritable. Fair insight and impaired judgement.  Pt states he has a slightly swollen foreskin that is not painful.  Pt has not been using underwear and is wearing scrubs. Recommended pt wear personal or disposable underwear. Pt is now washing his 1 pair of underwear and wearing disposable underwear. Pt stated this happened once before and he saw an urologist and they stated it was from masturbation and pt states this is not something he is engaging in the hospital. Pt also stated he was on gabapentin the last time this happened and he was told that it would not be a side effect to gabapentin.  Continues to have SI thoughts that are stronger than yesterday with no plan and contracts for safety . Pt first refused PRN visteral but later accepted it.

## 2019-06-17 ENCOUNTER — APPOINTMENT (OUTPATIENT)
Dept: GENERAL RADIOLOGY | Facility: CLINIC | Age: 29
DRG: 885 | End: 2019-06-17
Attending: STUDENT IN AN ORGANIZED HEALTH CARE EDUCATION/TRAINING PROGRAM
Payer: COMMERCIAL

## 2019-06-17 LAB
ALBUMIN SERPL-MCNC: 4.3 G/DL (ref 3.4–5)
ALP SERPL-CCNC: 60 U/L (ref 40–150)
ALT SERPL W P-5'-P-CCNC: 30 U/L (ref 0–70)
ANION GAP SERPL CALCULATED.3IONS-SCNC: 5 MMOL/L (ref 3–14)
AST SERPL W P-5'-P-CCNC: 18 U/L (ref 0–45)
BILIRUB SERPL-MCNC: 0.4 MG/DL (ref 0.2–1.3)
BUN SERPL-MCNC: 12 MG/DL (ref 7–30)
CALCIUM SERPL-MCNC: 9 MG/DL (ref 8.5–10.1)
CHLORIDE SERPL-SCNC: 107 MMOL/L (ref 94–109)
CO2 SERPL-SCNC: 28 MMOL/L (ref 20–32)
CREAT SERPL-MCNC: 1.05 MG/DL (ref 0.66–1.25)
ERYTHROCYTE [DISTWIDTH] IN BLOOD BY AUTOMATED COUNT: 11.2 % (ref 10–15)
GFR SERPL CREATININE-BSD FRML MDRD: >90 ML/MIN/{1.73_M2}
GLUCOSE SERPL-MCNC: 84 MG/DL (ref 70–99)
HCT VFR BLD AUTO: 48.8 % (ref 40–53)
HGB BLD-MCNC: 16.9 G/DL (ref 13.3–17.7)
LACTATE BLD-SCNC: 0.5 MMOL/L (ref 0.7–2)
LIPASE SERPL-CCNC: 150 U/L (ref 73–393)
MCH RBC QN AUTO: 31.9 PG (ref 26.5–33)
MCHC RBC AUTO-ENTMCNC: 34.6 G/DL (ref 31.5–36.5)
MCV RBC AUTO: 92 FL (ref 78–100)
PLATELET # BLD AUTO: 203 10E9/L (ref 150–450)
POTASSIUM SERPL-SCNC: 4.5 MMOL/L (ref 3.4–5.3)
PROT SERPL-MCNC: 7.7 G/DL (ref 6.8–8.8)
RBC # BLD AUTO: 5.3 10E12/L (ref 4.4–5.9)
SODIUM SERPL-SCNC: 140 MMOL/L (ref 133–144)
WBC # BLD AUTO: 7.4 10E9/L (ref 4–11)

## 2019-06-17 PROCEDURE — G0177 OPPS/PHP; TRAIN & EDUC SERV: HCPCS

## 2019-06-17 PROCEDURE — 83605 ASSAY OF LACTIC ACID: CPT | Performed by: STUDENT IN AN ORGANIZED HEALTH CARE EDUCATION/TRAINING PROGRAM

## 2019-06-17 PROCEDURE — 99207 ZZC CDG-MDM COMPONENT: MEETS MODERATE - UP CODED: CPT | Performed by: PSYCHIATRY & NEUROLOGY

## 2019-06-17 PROCEDURE — 99232 SBSQ HOSP IP/OBS MODERATE 35: CPT | Performed by: PSYCHIATRY & NEUROLOGY

## 2019-06-17 PROCEDURE — 25000132 ZZH RX MED GY IP 250 OP 250 PS 637: Performed by: PSYCHIATRY & NEUROLOGY

## 2019-06-17 PROCEDURE — 25000132 ZZH RX MED GY IP 250 OP 250 PS 637: Performed by: STUDENT IN AN ORGANIZED HEALTH CARE EDUCATION/TRAINING PROGRAM

## 2019-06-17 PROCEDURE — 36415 COLL VENOUS BLD VENIPUNCTURE: CPT | Performed by: STUDENT IN AN ORGANIZED HEALTH CARE EDUCATION/TRAINING PROGRAM

## 2019-06-17 PROCEDURE — 83690 ASSAY OF LIPASE: CPT | Performed by: STUDENT IN AN ORGANIZED HEALTH CARE EDUCATION/TRAINING PROGRAM

## 2019-06-17 PROCEDURE — 85027 COMPLETE CBC AUTOMATED: CPT | Performed by: STUDENT IN AN ORGANIZED HEALTH CARE EDUCATION/TRAINING PROGRAM

## 2019-06-17 PROCEDURE — 74019 RADEX ABDOMEN 2 VIEWS: CPT

## 2019-06-17 PROCEDURE — 80053 COMPREHEN METABOLIC PANEL: CPT | Performed by: STUDENT IN AN ORGANIZED HEALTH CARE EDUCATION/TRAINING PROGRAM

## 2019-06-17 PROCEDURE — 12400001 ZZH R&B MH UMMC

## 2019-06-17 RX ORDER — AMOXICILLIN 250 MG
1 CAPSULE ORAL DAILY PRN
Status: DISCONTINUED | OUTPATIENT
Start: 2019-06-17 | End: 2019-06-24 | Stop reason: HOSPADM

## 2019-06-17 RX ORDER — POLYETHYLENE GLYCOL 3350 17 G/17G
17 POWDER, FOR SOLUTION ORAL 2 TIMES DAILY
Status: DISPENSED | OUTPATIENT
Start: 2019-06-17 | End: 2019-06-19

## 2019-06-17 RX ORDER — LITHIUM CARBONATE 300 MG/1
300 TABLET, FILM COATED, EXTENDED RELEASE ORAL EVERY 12 HOURS SCHEDULED
Status: DISCONTINUED | OUTPATIENT
Start: 2019-06-17 | End: 2019-06-24 | Stop reason: HOSPADM

## 2019-06-17 RX ADMIN — BISACODYL 10 MG: 10 SUPPOSITORY RECTAL at 17:40

## 2019-06-17 RX ADMIN — LITHIUM CARBONATE 300 MG: 300 TABLET, EXTENDED RELEASE ORAL at 14:07

## 2019-06-17 RX ADMIN — SODIUM PHOSPHATE, DIBASIC AND SODIUM PHOSPHATE, MONOBASIC 1 ENEMA: 7; 19 ENEMA RECTAL at 22:17

## 2019-06-17 RX ADMIN — NICOTINE POLACRILEX 4 MG: 2 LOZENGE ORAL at 17:24

## 2019-06-17 RX ADMIN — LITHIUM CARBONATE 300 MG: 300 TABLET, EXTENDED RELEASE ORAL at 21:44

## 2019-06-17 RX ADMIN — NICOTINE POLACRILEX 4 MG: 2 LOZENGE ORAL at 12:06

## 2019-06-17 RX ADMIN — MELATONIN TAB 3 MG 3 MG: 3 TAB at 23:14

## 2019-06-17 RX ADMIN — POLYETHYLENE GLYCOL 3350 17 G: 17 POWDER, FOR SOLUTION ORAL at 21:44

## 2019-06-17 RX ADMIN — NICOTINE POLACRILEX 4 MG: 2 LOZENGE ORAL at 20:31

## 2019-06-17 RX ADMIN — VENLAFAXINE HYDROCHLORIDE 37.5 MG: 37.5 TABLET, EXTENDED RELEASE ORAL at 10:04

## 2019-06-17 ASSESSMENT — ACTIVITIES OF DAILY LIVING (ADL)
LAUNDRY: WITH SUPERVISION
DRESS: INDEPENDENT
ORAL_HYGIENE: INDEPENDENT
HYGIENE/GROOMING: INDEPENDENT

## 2019-06-17 NOTE — PLAN OF CARE
"  Pt reported a sharp increase in suicidal thoughts today. \"Today was a bad day, probably the worst day since I've been here. My suicidal thinking was worse today. Could that be related to lamictal?\" Writer confirmed that it was possible that lamictal could contribute to a surge in SI based on reading information related to the medication in nursing drug handbook, but encouraged him to discuss with attending physician tomorrow. Also it was reported that pt was uncharacteristically irritable and agitated earier today as well, which is listed in the warning along with increased SI. Pt also continues to report discoloration of foreskin, which he reported to day RN as well. Due to pt starting lamictal recently and potential for rash/Yaya's-Sorin, writer offered to take a look at it if pt was comfortable doing so. He agreed. On inspection, there appeared to be a slightly darker band of skin around the inner surface of foreskin. It does not appear to be a rash per se, lacks defined borders. No redness. Pt denies pain, itching, altered sensation. Pt also states he has noticed it in the past long before starting lamictal and has discussed it with other providers. This discussion occurred after pt had received scheduled HS dose of lamictal 25 mg po, however, writer passed on recommendation that team consider pt IM consult tomorrow to assess more fully. Pt also c/o constipation and has take MOM for past 3 nights without significant results. Offered bisacodyl supp. but pt declined. States he has taken mag citrate in past to good effect and will ask for it tomorrow if he does not have results some time in the morning.   "

## 2019-06-17 NOTE — PROGRESS NOTES
Patient is calm and cooperative. His main concern is his stomach pain and digestion issues. He said that he is feeling much better mentally today than he was yesterday. He rated depression and anxiety at 6-7. He said today he has not had suicidal thoughts but he did yesterday. He is optimistic and looks forward to getting better. He has a good appetite and has been sleeping well. He has concerns about a fax issue with his work but he seems to be on top of figuring it out. No other concerns for now.

## 2019-06-17 NOTE — PROGRESS NOTES
"   06/16/19 2217   Behavioral Health   Hallucinations denies / not responding to hallucinations   Thinking intact   Orientation person: oriented;place: oriented;date: oriented;time: oriented   Memory baseline memory   Insight admits / accepts   Judgement intact   Eye Contact at floor;into space   Affect other (see comments)  (mid range)   Mood depressed;anxious   Physical Appearance/Attire appears younger than stated age   Hygiene well groomed   Suicidality chronic thoughts with no stated plan   1. Wish to be Dead Yes   2. Non-Specific Active Suicidal Thoughts  Yes   3. Active Sucidal Ideation with any Methods (Not Plan) Without Intent to Act  No   4. Active Suicidal Ideation with Some Intent to Act, Without Specific Plan  No   5. Active Suicidal Ideation with Specific Plan and Intent  No   Change in Protective Factors? No   Enviromental Risk Factors None   Self Injury other (see comment)  (denies)   Elopement   (none)   Activity isolative;withdrawn;restless   Speech clear;coherent   Medication Sensitivity no stated side effects;no observed side effects   Psychomotor / Gait balanced;steady   Daily Care   Activity up ad tobin   Patient Performed Hygiene other (see comments)  (per pt)   Activities of Daily Living   Hygiene/Grooming independent   Oral Hygiene independent   Dress scrubs (behavioral health);independent   Laundry with supervision   Room Organization independent     Pt had \"the worst depression and suicidal ideation I have had since I have checked into the hospital\" and spent the first half of the shift isolating in room. Pt emerged about alf through the shift and socialized with peers because \"I always tell myself that if I stay in bed all day of course I will be depressed.\" Pt wonders if low mood a result of medication sensitivity or it being fathers day and having a poor and estranged relationship with father. Pt would like to meet with doctor tomorrow to discuss potential medication effects, but " "otherwise reports that sleep and appetite and concentration all seem fine. Pt was sociable and friendly after sharing feelings with some peers and seems to be ending the night more hopeful than how the day began. Pt shows good insight, stating \"I know my best hope is to be honest with everyone about how I am doing, even if I want to leave sometimes, because I would probably just end up back in here if I don't try and do it right the first time.\" Depression and anxiety are rated at 10. SI is present but pt contracted for safety. Pt denies SIB and hallucinations.  "

## 2019-06-17 NOTE — CONSULTS
Brief Medicine Note:    Internal medicine consulted for discoloration of the penis. Per thorough note by RN bridget Frank, the foreskin is discolored a darker color than the rest of the penis. Per note, no defined borders and no redness. It is described as non-painful without itching or change in sensation. Similar skin changes have occurred in the past before patient was ever started on Lamictal. Consult was placed for internal medicine. Per d/w Dr. Soto, given similar presentation unrelated to Lamictal in the past, will defer consult at this time.  - Given new Lamictal, please urgently consult medicine if rash changes in nature (new redness, swelling, pain, skin flaking, drainage) or if rash is noted anywhere else on the body  - Suggest ongoing OP follow up as patient has done for this discoloration in the josue Fields PA-C  Internal Medicine [facility]  [phone]

## 2019-06-18 PROCEDURE — 12400001 ZZH R&B MH UMMC

## 2019-06-18 PROCEDURE — 25000132 ZZH RX MED GY IP 250 OP 250 PS 637: Performed by: PSYCHIATRY & NEUROLOGY

## 2019-06-18 PROCEDURE — 99231 SBSQ HOSP IP/OBS SF/LOW 25: CPT | Performed by: PSYCHIATRY & NEUROLOGY

## 2019-06-18 PROCEDURE — G0177 OPPS/PHP; TRAIN & EDUC SERV: HCPCS

## 2019-06-18 RX ADMIN — NICOTINE POLACRILEX 4 MG: 2 LOZENGE ORAL at 15:57

## 2019-06-18 RX ADMIN — NICOTINE POLACRILEX 4 MG: 2 LOZENGE ORAL at 17:27

## 2019-06-18 RX ADMIN — MELATONIN TAB 3 MG 3 MG: 3 TAB at 22:03

## 2019-06-18 RX ADMIN — NICOTINE POLACRILEX 4 MG: 2 LOZENGE ORAL at 18:30

## 2019-06-18 RX ADMIN — NICOTINE POLACRILEX 4 MG: 2 LOZENGE ORAL at 20:17

## 2019-06-18 RX ADMIN — NICOTINE POLACRILEX 4 MG: 2 LOZENGE ORAL at 22:02

## 2019-06-18 RX ADMIN — NICOTINE POLACRILEX 4 MG: 2 LOZENGE ORAL at 08:16

## 2019-06-18 RX ADMIN — LITHIUM CARBONATE 300 MG: 300 TABLET, EXTENDED RELEASE ORAL at 08:14

## 2019-06-18 RX ADMIN — NICOTINE POLACRILEX 4 MG: 2 LOZENGE ORAL at 10:01

## 2019-06-18 RX ADMIN — NICOTINE POLACRILEX 4 MG: 2 LOZENGE ORAL at 13:38

## 2019-06-18 RX ADMIN — TRAZODONE HYDROCHLORIDE 50 MG: 50 TABLET ORAL at 22:42

## 2019-06-18 RX ADMIN — LITHIUM CARBONATE 300 MG: 300 TABLET, EXTENDED RELEASE ORAL at 20:17

## 2019-06-18 ASSESSMENT — ACTIVITIES OF DAILY LIVING (ADL)
DRESS: SCRUBS (BEHAVIORAL HEALTH);INDEPENDENT
HYGIENE/GROOMING: INDEPENDENT
ORAL_HYGIENE: INDEPENDENT
LAUNDRY: WITH SUPERVISION

## 2019-06-18 ASSESSMENT — MIFFLIN-ST. JEOR: SCORE: 1726.02

## 2019-06-18 NOTE — PROGRESS NOTES
06/18/19 1001   General Information   Date Initially Attended OT 06/17/19     Groups Attended: OT Clinic     Affect/Hygiene/Presentation: Calm/pleasant mood, engaged, bright affect. No apparent hygiene concerns.     Patient Performance/Response: Pt actively participated in occupational therapy clinic. Pt was able to ask for assistance as needed, and independently initiate a familiar, single-step, creative expression task without difficulty. Pt demonstrated good focus and planning during task completion. Pt appeared comfortable interacting with peers and writer, and socialized appropriately during his time in group.    Plan: More information needed to complete OT Initial Assessment; OT staff will meet with pt to review the role of occupational therapy and explain the value of having them involved in their treatment plan including options to meet current needs/self-identified goals. As group attendance is established, Pt will be given self-assessment to inform OT initial assessment.

## 2019-06-18 NOTE — PROGRESS NOTES
Behavioral Health  Note    Behavioral Health  Spirituality Group Note    UNIT 10[address]    Name: Mathew Lainez YOB: 1990   MRN: [id number] Age: 29 year old      Patient attended -led group, which included discussion of spirituality, coping with illness and building resilience.    Patient attended group for 1.0 hrs.    The patient actively participated in group discussion and patient demonstrated an appreciation of topic's application for their personal circumstances.      Sonali Elizabeth  Chaplain Resident  Pager  [phone]\

## 2019-06-18 NOTE — PLAN OF CARE
"Pt again has been very irritable and short this shift. Pt became upset when he was told IM was not seeing him. He got rather loud stating how upset he was about that. Writer explained that IM saw the note the nurse wrote and felt he did not need to be seen for his foreskin discoloration.  Pt became upset even more stating he needed to be seen for his constipation. This was first pt had told writer about any constipation issue and she was his nurse all weekend. Offered prune juice, MOM or suppository.  Pt had MOM on 14, 15 and 16. Pt first declined all then decided on the suppository. About an hour later pt became to writer very upset demanding for a Dr. To come and see him for his constipation and now R upper and lower quadrant pain. No BM since admission 6/13/2019 besides some \"water\"  Moonlighter called and ordered Xray and labs and then a enema.  Continues to have SI thoughts with no plan. States his SI thoughts are 11/10 with 10 being high and stated \"this is the worst day yet\"  Contracts for safety.  No supper. Medication complaint except melatonin which he might still take. Poor concentration. Fair insight. Impaired judgement. Mood and affect irritable.  Pt had 1 small result after the enema at 1030.  "

## 2019-06-18 NOTE — PROGRESS NOTES
"[facility], [place]   Psychiatric Progress Note        Interim History:   Reason for Hospitalization:Mathew is a 29 year old male with history of depressive mood, alcohol and cannabis use who is admitted on a voluntary basis for worsening depressive moods with suicidal ideation.    Subjective: \"Doing ok, trying to learn things in group, and hopefully get on the right meds to help me, been feeling more suicidal compared to when I first came in\"     As per today's interview: Patient was pleasant and somewhat anxious up on approach. He organized a list of questions he wanted to ask me, which we went over for some time. He felt increased suicidal ideation, but denied intent or plan. Remains future oriented. Hopes to take some time off work to focus on his mental health.          Medications:       lithium ER  300 mg Oral Q12H SETH     melatonin  3 mg Oral At Bedtime     polyethylene glycol  17 g Oral BID     sodium phosphate  1 enema Rectal Once          Allergies:     Allergies   Allergen Reactions     No Known Drug Allergies           Labs:     Recent Results (from the past 48 hour(s))   Comprehensive metabolic panel    Collection Time: 06/17/19  8:47 PM   Result Value Ref Range    Sodium 140 133 - 144 mmol/L    Potassium 4.5 3.4 - 5.3 mmol/L    Chloride 107 94 - 109 mmol/L    Carbon Dioxide 28 20 - 32 mmol/L    Anion Gap 5 3 - 14 mmol/L    Glucose 84 70 - 99 mg/dL    Urea Nitrogen 12 7 - 30 mg/dL    Creatinine 1.05 0.66 - 1.25 mg/dL    GFR Estimate >90 >60 mL/min/[1.73_m2]    GFR Estimate If Black >90 >60 mL/min/[1.73_m2]    Calcium 9.0 8.5 - 10.1 mg/dL    Bilirubin Total 0.4 0.2 - 1.3 mg/dL    Albumin 4.3 3.4 - 5.0 g/dL    Protein Total 7.7 6.8 - 8.8 g/dL    Alkaline Phosphatase 60 40 - 150 U/L    ALT 30 0 - 70 U/L    AST 18 0 - 45 U/L   CBC with platelets    Collection Time: 06/17/19  8:47 PM   Result Value Ref Range    WBC 7.4 4.0 - 11.0 10e9/L    RBC Count 5.30 4.4 - 5.9 10e12/L    " "Hemoglobin 16.9 13.3 - 17.7 g/dL    Hematocrit 48.8 40.0 - 53.0 %    MCV 92 78 - 100 fl    MCH 31.9 26.5 - 33.0 pg    MCHC 34.6 31.5 - 36.5 g/dL    RDW 11.2 10.0 - 15.0 %    Platelet Count 203 150 - 450 10e9/L   Lipase    Collection Time: 06/17/19  8:47 PM   Result Value Ref Range    Lipase 150 73 - 393 U/L   Lactic acid whole blood    Collection Time: 06/17/19  8:47 PM   Result Value Ref Range    Lactic Acid 0.5 (L) 0.7 - 2.0 mmol/L          Psychiatric Examination:   /69   Pulse 70   Temp 96.7  F (35.9  C) (Oral)   Resp 16   Ht 1.803 m (5' 11\")   Wt 76.1 kg (167 lb 11.2 oz)   SpO2 98%   BMI 23.39 kg/m    Weight is 167 lbs 11.2 oz  Body mass index is 23.39 kg/m .    Appearance: White male. Moderately well groomed. In hospital scrubs. No acute distress.   Attitude:  cooperative  Eye Contact:  Intermittent    Mood:  depressed  Affect:  intensity is blunted  Speech:  clear, coherent, however speaks more rapidly and more anxious compared to before.   Language: fluent and intact in English  Psychomotor, Gait, Musculoskeletal:  no evidence of tardive dyskinesia, dystonia, or tics  Throught Process:  logical, linear and goal oriented  Associations:  no loose associations  Thought Content:  Passive SI, but no intent or plan. no evidence of psychotic thought, no auditory hallucinations present and no visual hallucinations present  Insight:  fair  Judgement:  fair  Oriented to:  time, person, and place  Attention Span and Concentration:  fair  Recent and Remote Memory:  fair  Fund of Knowledge:  appropriate      Assessment & Plan       Principal Diagnosis:   MDD (major depressive disorder), recurrent severe, without psychosis vs. Bipolar 2 Disorder   Cannabis Use Disorder  R/o Alcohol Use Disorder     Assessment:   (Initial Assessment): Patient appears to have worsening depressive symptoms with suicidality. He has not been on psychotropic medications and has been \"self medicating\" with frequent cannabis and " occasional episodic binges of alcohol use. Along with depression, he also seems to have some symptoms of hypomania, with depression being the main component (with anergy, apathy, avolition, and negative self ruminations being primary symptoms). We discussed medications to help with some of these symptoms, including Effexor and Lamictal, both which he was open to take.      Update 6/14: Patient has been attending groups, and taking his medications. He talks about some some mild dissociative like events that occur at various times during the day, but does not seem anything wholly worrisome. He has continued depressive moods, but overall future oriented, pleasant ,and is willing to take any recommendations for     Update 6/17: Patient seems to have had worsened depressive/suicidal/manic like moods with effexor and lamictal. Today he seems more anxious, pressured, and with a flight of ideas. We discussed adding on Lithium to help stabilize his depressive, suicidal and manic moods, which he was open to. Risks/benefits discussed and dose was started.      Plan:  1.) Medication Plan:  - Lithium 300 mg BID   - Melatonin 3 mg HS     2.) Psychosocial Plan:  - Patient could benefit from individual therapy. Will also discuss partial/day program options.   - Discuss possible partial/day program options.      Legal:  Voluntary      Disposition:  Likely discharge in 2-3 days     Safety Assessment:   Checks: Status 15  Precautions: Suicide  Pt has not required locked seclusion or restraints in the past 24 hours to maintain safety, please refer to RN documentation for further details.       The risks, benefits, alternatives and side effects have been discussed and are understood by the patient and other caregivers.         Attestation:  Patient has been seen and evaluated by me,  Jagdeep Soto MD

## 2019-06-18 NOTE — PROGRESS NOTES
"Patient had no goal during the day. Patient rates depression at a 7 and anxiety at a 2.   Patient denies current or recent suicidal ideation or behaviors. and denies current or recent self injurious behavior or ideation.    Appearance/Behavior: Casually groomed  Speech: Normal  Mood/Affect: normal affect  Insight: Adequate  Patient has  been attending to ADLs, eating well, sleeping well.  Pt shares, \"my suicidal thoughts come and go along with my anxiety. I don't have any suicidal thoughts right now.\"     06/18/19 1325   Behavioral Health   Hallucinations denies / not responding to hallucinations   Thinking other (see comment);distractable  (racing thoughts)   Orientation person: oriented;place: oriented;date: oriented;time: oriented   Memory baseline memory   Insight admits / accepts   Judgement impaired   Eye Contact at examiner   Affect full range affect   Mood mood is calm   Physical Appearance/Attire appears stated age   Hygiene well groomed   Suicidality other (see comments)  (denies)   1. Wish to be Dead No   2. Non-Specific Active Suicidal Thoughts  No   Self Injury other (see comment)  (denies)   Elopement   (none stated/observed)   Activity other (see comment)  (visible in milieu, attending groups)   Speech coherent;clear   Psychomotor / Gait steady;balanced   Activities of Daily Living   Hygiene/Grooming independent   Oral Hygiene independent   Dress scrubs (behavioral health);independent   Laundry with supervision   Room Organization independent              "

## 2019-06-18 NOTE — PROGRESS NOTES
"Called RE L sided abdominal pain. Give suppository earlier and had severe abdominal pain. No BM in past 3 days. Has had problems with constipation in the past. Typically uses enema to address this.     Blood pressure 111/69, pulse 70, temperature 96.7  F (35.9  C), temperature source Oral, resp. rate 16, height 1.803 m (5' 11\"), weight 76.1 kg (167 lb 11.2 oz), SpO2 98 %.  abdominal exam soft, mildly distended, tender mildly in left quadrants, bs +    CMP, CBC, Lactate, Lipase   AXR for baseline     Enema w/ 4 doses of miralax ordered   "

## 2019-06-19 PROCEDURE — 12400001 ZZH R&B MH UMMC

## 2019-06-19 PROCEDURE — G0177 OPPS/PHP; TRAIN & EDUC SERV: HCPCS

## 2019-06-19 PROCEDURE — 90853 GROUP PSYCHOTHERAPY: CPT

## 2019-06-19 PROCEDURE — 99231 SBSQ HOSP IP/OBS SF/LOW 25: CPT | Performed by: PSYCHIATRY & NEUROLOGY

## 2019-06-19 PROCEDURE — 25000132 ZZH RX MED GY IP 250 OP 250 PS 637: Performed by: PSYCHIATRY & NEUROLOGY

## 2019-06-19 RX ADMIN — LITHIUM CARBONATE 300 MG: 300 TABLET, EXTENDED RELEASE ORAL at 09:32

## 2019-06-19 RX ADMIN — NICOTINE POLACRILEX 4 MG: 2 LOZENGE ORAL at 20:33

## 2019-06-19 RX ADMIN — NICOTINE POLACRILEX 4 MG: 2 LOZENGE ORAL at 22:09

## 2019-06-19 RX ADMIN — NICOTINE POLACRILEX 4 MG: 2 LOZENGE ORAL at 19:09

## 2019-06-19 RX ADMIN — NICOTINE POLACRILEX 4 MG: 2 LOZENGE ORAL at 14:29

## 2019-06-19 RX ADMIN — NICOTINE POLACRILEX 4 MG: 2 LOZENGE ORAL at 12:51

## 2019-06-19 RX ADMIN — LITHIUM CARBONATE 300 MG: 300 TABLET, EXTENDED RELEASE ORAL at 21:12

## 2019-06-19 RX ADMIN — NICOTINE POLACRILEX 4 MG: 2 LOZENGE ORAL at 09:32

## 2019-06-19 RX ADMIN — MELATONIN TAB 3 MG 3 MG: 3 TAB at 21:43

## 2019-06-19 RX ADMIN — NICOTINE POLACRILEX 4 MG: 2 LOZENGE ORAL at 16:08

## 2019-06-19 ASSESSMENT — ACTIVITIES OF DAILY LIVING (ADL)
HYGIENE/GROOMING: INDEPENDENT
DRESS: INDEPENDENT
HYGIENE/GROOMING: INDEPENDENT
LAUNDRY: WITH SUPERVISION
ORAL_HYGIENE: INDEPENDENT
DRESS: INDEPENDENT
LAUNDRY: WITH SUPERVISION
ORAL_HYGIENE: INDEPENDENT

## 2019-06-19 NOTE — PROGRESS NOTES
"[facility], [place]   Psychiatric Progress Note        Interim History:   Reason for Hospitalization:Mathew is a 29 year old male with history of depressive mood, alcohol and cannabis use who is admitted on a voluntary basis for worsening depressive moods with suicidal ideation.    Subjective: \"Doing ok today, hopeful these medications can help me\"    As per today's interview: Patient was pleasant and somewhat anxious up on approach. He has some irritability toward nursing staff for various reasons ( not getting an IM consult, constipation complaints). After I brought this up, he was rather ashamed by it, and apologized. We discussed appropriate ways to communicate with staff. He tolerated his medications, and was open to continue it.          Medications:       lithium ER  300 mg Oral Q12H SETH     melatonin  3 mg Oral At Bedtime     polyethylene glycol  17 g Oral BID          Allergies:     Allergies   Allergen Reactions     No Known Drug Allergies           Labs:     Recent Results (from the past 48 hour(s))   Comprehensive metabolic panel    Collection Time: 06/17/19  8:47 PM   Result Value Ref Range    Sodium 140 133 - 144 mmol/L    Potassium 4.5 3.4 - 5.3 mmol/L    Chloride 107 94 - 109 mmol/L    Carbon Dioxide 28 20 - 32 mmol/L    Anion Gap 5 3 - 14 mmol/L    Glucose 84 70 - 99 mg/dL    Urea Nitrogen 12 7 - 30 mg/dL    Creatinine 1.05 0.66 - 1.25 mg/dL    GFR Estimate >90 >60 mL/min/[1.73_m2]    GFR Estimate If Black >90 >60 mL/min/[1.73_m2]    Calcium 9.0 8.5 - 10.1 mg/dL    Bilirubin Total 0.4 0.2 - 1.3 mg/dL    Albumin 4.3 3.4 - 5.0 g/dL    Protein Total 7.7 6.8 - 8.8 g/dL    Alkaline Phosphatase 60 40 - 150 U/L    ALT 30 0 - 70 U/L    AST 18 0 - 45 U/L   CBC with platelets    Collection Time: 06/17/19  8:47 PM   Result Value Ref Range    WBC 7.4 4.0 - 11.0 10e9/L    RBC Count 5.30 4.4 - 5.9 10e12/L    Hemoglobin 16.9 13.3 - 17.7 g/dL    Hematocrit 48.8 40.0 - 53.0 %    MCV 92 78 " "- 100 fl    MCH 31.9 26.5 - 33.0 pg    MCHC 34.6 31.5 - 36.5 g/dL    RDW 11.2 10.0 - 15.0 %    Platelet Count 203 150 - 450 10e9/L   Lipase    Collection Time: 06/17/19  8:47 PM   Result Value Ref Range    Lipase 150 73 - 393 U/L   Lactic acid whole blood    Collection Time: 06/17/19  8:47 PM   Result Value Ref Range    Lactic Acid 0.5 (L) 0.7 - 2.0 mmol/L          Psychiatric Examination:   /69   Pulse 70   Temp 96.9  F (36.1  C) (Tympanic)   Resp 16   Ht 1.803 m (5' 11\")   Wt 74.9 kg (165 lb 1.6 oz)   SpO2 98%   BMI 23.03 kg/m    Weight is 165 lbs 1.6 oz  Body mass index is 23.03 kg/m .    Appearance: White male. Moderately well groomed. In hospital scrubs. No acute distress.   Attitude:  cooperative  Eye Contact:  Intermittent    Mood:  depressed  Affect:  intensity is blunted  Speech:  clear, coherent, however speaks more rapidly and more anxious compared to before.   Language: fluent and intact in English  Psychomotor, Gait, Musculoskeletal:  no evidence of tardive dyskinesia, dystonia, or tics  Throught Process:  logical, linear and goal oriented  Associations:  no loose associations  Thought Content:  Denies SI, no intent or plan. no evidence of psychotic thought, no auditory hallucinations present and no visual hallucinations present  Insight:  fair  Judgement:  fair  Oriented to:  time, person, and place  Attention Span and Concentration:  fair  Recent and Remote Memory:  fair  Fund of Knowledge:  appropriate      Assessment & Plan       Principal Diagnosis:   MDD (major depressive disorder), recurrent severe, without psychosis vs. Bipolar 2 Disorder   Cannabis Use Disorder  R/o Alcohol Use Disorder     Assessment:   (Initial Assessment): Patient appears to have worsening depressive symptoms with suicidality. He has not been on psychotropic medications and has been \"self medicating\" with frequent cannabis and occasional episodic binges of alcohol use. Along with depression, he also seems to " have some symptoms of hypomania, with depression being the main component (with anergy, apathy, avolition, and negative self ruminations being primary symptoms). We discussed medications to help with some of these symptoms, including Effexor and Lamictal, both which he was open to take.      Update 6/14: Patient has been attending groups, and taking his medications. He talks about some some mild dissociative like events that occur at various times during the day, but does not seem anything wholly worrisome. He has continued depressive moods, but overall future oriented, pleasant ,and is willing to take any recommendations for     Update 6/17: Patient seems to have had worsened depressive/suicidal/manic like moods with effexor and lamictal. Today he seems more anxious, pressured, and with a flight of ideas. We discussed adding on Lithium to help stabilize his depressive, suicidal and manic moods, which he was open to. Risks/benefits discussed and dose was started.     Update 6/18: Tolerating medications. Better moods today. Social with peers, attending groups, and active in milieu.      Plan:  1.) Medication Plan:  - Lithium 300 mg BID   - Melatonin 3 mg HS     2.) Psychosocial Plan:  - Patient could benefit from individual therapy. Will also discuss partial/day program options.   - Discuss possible partial/day program options.      Legal:  Voluntary      Disposition:  Likely discharge in 2-3 days     Safety Assessment:   Checks: Status 15  Precautions: Suicide  Pt has not required locked seclusion or restraints in the past 24 hours to maintain safety, please refer to RN documentation for further details.       The risks, benefits, alternatives and side effects have been discussed and are understood by the patient and other caregivers.         Attestation:  Patient has been seen and evaluated by me,  Jagdeep Soto MD

## 2019-06-19 NOTE — PROGRESS NOTES
CLINICAL NUTRITION SERVICES - REASSESSMENT NOTE     Nutrition Prescription    RECOMMENDATIONS FOR MDs/PROVIDERS TO ORDER:  None at this time     Malnutrition Status:    Non-severe malnutrition in the context of acute illness.     Recommendations already ordered by Registered Dietitian (RD):  None at this time- pt declined    Future/Additional Recommendations:  1. Monitor PO and wt trends  2. Monitor GI symptoms     EVALUATION OF THE PROGRESS TOWARD GOALS   Diet: Regular    Intake: Pt reports a fair appetite but that it has been somewhat up and down. Reports walking a lot and that he has been trying to eat healthier food and continue his intentional weight loss started PTA.      NEW FINDINGS   Wt trends: Wt loss of 4.2kg (5%) over the past week.   06/18/19 0753 74.9 kg (165 lb 1.6 oz) SJ   06/13/19 0900 76.1 kg (167 lb 11.2 oz) AJ   06/13/19 0035 77.1 kg (170 lb) JR   06/12/19 1948 79.1 kg (174 lb 6.4 oz) MT     GI: Reporting some constipation, using enema and miralax- likely contributing to variable appetite     MALNUTRITION  % Intake: < 75% for > 7 days (non-severe)- partially due to intentionally eating lower calorie foods   % Weight Loss: > 2% in 1 week (severe)- some intentional   Subcutaneous Fat Loss: None observed  Muscle Loss: None observed  Fluid Accumulation/Edema: None noted  Malnutrition Diagnosis: Non-severe malnutrition in the context of acute illness.     Previous Goals   Patient to consume % of nutritionally adequate meal trays TID, or the equivalent with supplements/snacks.  Evaluation: Not met    Previous Nutrition Diagnosis  Predicted inadequate nutrient intake (calorie/protein) related to current variable appetite and intake as evidenced by potential for decline.     Evaluation: Declining, changed below     CURRENT NUTRITION DIAGNOSIS  Inadequate protein-energy intake related to variable appetite and GI distress (constipation) as evidenced by pt report of up and down appetite and 5% wt loss  over the past week.       INTERVENTIONS  Implementation  Nutrition Education: Discussed healthy/sustainable weight loss of 1-2 lbs a week. Encouraged intake of fluids, meals TID, and snacks.   Modify composition of meals/snacks- pt declined any snacks at this time     Goals  Patient to consume % of nutritionally adequate meal trays TID, or the equivalent with supplements/snacks.    Monitoring/Evaluation  Progress toward goals will be monitored and evaluated per protocol.    Denisse Navarro, Nutrition   Unit pager: [phone]

## 2019-06-19 NOTE — PROGRESS NOTES
Groups Attended: OT Mental Health Management     Affect/Hygiene/Presentation: Pleasant mood, engaged, bright affect. No apparent hygiene concerns.     Patient Performance/Response: Pt attended a structured OT group with a focus on self-awareness and socialization. Pt appeared comfortable sharing positive personal information, and was respectful in listening and responding to peers. Pt was able to identify positive personal strengths, and encouraged peers throughout the activity.     Plan: More information needed to complete OT Initial Assessment; OT staff will meet with pt to review the role of occupational therapy and explain the value of having them involved in their treatment plan including options to meet current needs/self-identified goals. As group attendance is established, Pt will be given self-assessment to inform OT initial assessment.

## 2019-06-19 NOTE — PROGRESS NOTES
"Occupational Therapy Initial Assessment      Description: OT staff met with pt to review the role of occupational therapy and explain the value of having them involved in their treatment plan including options to meet current needs/self-identified goals. The below evaluation is a compilation of chart review, functional performance observation, and information obtained from an OT self-assessment.      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 Play guitar, try to read and write when I can.\"        06/18/19 1001   General Information   Date Initially Attended OT 06/17/19   Clinical Impression   Affect Appropriate to situation   Orientation Oriented to person, place and time   Appearance and ADLs General cleanliness observed in most areas   Attention to Internal Stimuli No observed signs   Interaction Skills Interacts appropriately with staff;Initiates appropriately with staff;Interacts appropriately with peers;Initiates appropriately with peers   Ability to Communicate Needs Independent   Verbal Content Clear;Appropriate to topic;Articulate   Ability to Maintain Boundaries Maintains appropriate physical boundaries;Maintains appropriate verbal boundaries   Participation Independently participates;Initiates participation   Concentration Concentrates 50 minutes   Ability to Concentrate With structure   Follows and Comprehends Directions Independently follows multi-step directions   Memory Delayed and immediate recall intact   Organization Independently organizes all tasks   Decision Making Independent   Planning and Problem Solving Occasionally needs assist/feedback   Ability to Apply and Learn Concepts Comprehends concepts, but needs assist to apply   Frustrations / Stress Tolerance Independently identifies skills   (Cooking, guitar, scattered day dreaming)   Level of Insight Insightful into needs, issues, goals  (Stressors: impulsive with money, " smoking tobacco, friends)   Self Esteem Can identify positives;Takes risks with support and encouragement;Accepts positive feedback  (Strenghts: usually positive and high-energy)   Social Supports Has knowledge of support systems  (Roommate, sister, friends)        Pt identified interest to explore healthy coping strategies via: journaling and music in hopes to improve overall wellbeing.     Assessment: Pt would benefit from engagement in OT groups that support healthy recovery, specifically working on self-care to improve wellness, improvement in focus/concentration, coping skill exploration, routine development, and role fulfillment for symptom management.      Plan: Initiate occupational therapy goals per plan of care.      Current Goal: Pt will demonstrate increased self-cares to improve ADL/IADL completion during >2 scheduled OT groups within 1 week.

## 2019-06-19 NOTE — PROGRESS NOTES
"[facility], [place]   Psychiatric Progress Note        Interim History:   Reason for Hospitalization:Mathew is a 29 year old male with history of depressive mood, alcohol and cannabis use who is admitted on a voluntary basis for worsening depressive moods with suicidal ideation.    Subjective: \"Doing ok today, hopeful these medications can help me\"    As per today's interview: Patient was pleasant and somewhat anxious up on approach. He has some irritability toward nursing staff for various reasons ( not getting an IM consult, constipation complaints). After I brought this up, he was rather ashamed by it, and apologized. We discussed appropriate ways to communicate with staff. He tolerated his medications, and was open to continue it.          Medications:       lithium ER  300 mg Oral Q12H SETH     melatonin  3 mg Oral At Bedtime     polyethylene glycol  17 g Oral BID          Allergies:     Allergies   Allergen Reactions     No Known Drug Allergies           Labs:     Recent Results (from the past 48 hour(s))   Comprehensive metabolic panel    Collection Time: 06/17/19  8:47 PM   Result Value Ref Range    Sodium 140 133 - 144 mmol/L    Potassium 4.5 3.4 - 5.3 mmol/L    Chloride 107 94 - 109 mmol/L    Carbon Dioxide 28 20 - 32 mmol/L    Anion Gap 5 3 - 14 mmol/L    Glucose 84 70 - 99 mg/dL    Urea Nitrogen 12 7 - 30 mg/dL    Creatinine 1.05 0.66 - 1.25 mg/dL    GFR Estimate >90 >60 mL/min/[1.73_m2]    GFR Estimate If Black >90 >60 mL/min/[1.73_m2]    Calcium 9.0 8.5 - 10.1 mg/dL    Bilirubin Total 0.4 0.2 - 1.3 mg/dL    Albumin 4.3 3.4 - 5.0 g/dL    Protein Total 7.7 6.8 - 8.8 g/dL    Alkaline Phosphatase 60 40 - 150 U/L    ALT 30 0 - 70 U/L    AST 18 0 - 45 U/L   CBC with platelets    Collection Time: 06/17/19  8:47 PM   Result Value Ref Range    WBC 7.4 4.0 - 11.0 10e9/L    RBC Count 5.30 4.4 - 5.9 10e12/L    Hemoglobin 16.9 13.3 - 17.7 g/dL    Hematocrit 48.8 40.0 - 53.0 %    MCV 92 78 " "- 100 fl    MCH 31.9 26.5 - 33.0 pg    MCHC 34.6 31.5 - 36.5 g/dL    RDW 11.2 10.0 - 15.0 %    Platelet Count 203 150 - 450 10e9/L   Lipase    Collection Time: 06/17/19  8:47 PM   Result Value Ref Range    Lipase 150 73 - 393 U/L   Lactic acid whole blood    Collection Time: 06/17/19  8:47 PM   Result Value Ref Range    Lactic Acid 0.5 (L) 0.7 - 2.0 mmol/L          Psychiatric Examination:   /70   Pulse 70   Temp 97.1  F (36.2  C) (Tympanic)   Resp 16   Ht 1.803 m (5' 11\")   Wt 74.9 kg (165 lb 1.6 oz)   SpO2 98%   BMI 23.03 kg/m    Weight is 165 lbs 1.6 oz  Body mass index is 23.03 kg/m .    Appearance: White male. Moderately well groomed. In hospital scrubs. No acute distress.   Attitude:  cooperative  Eye Contact:  Intermittent    Mood:  depressed  Affect:  intensity is blunted  Speech:  clear, coherent, however speaks more rapidly and more anxious compared to before.   Language: fluent and intact in English  Psychomotor, Gait, Musculoskeletal:  no evidence of tardive dyskinesia, dystonia, or tics  Throught Process:  logical, linear and goal oriented  Associations:  no loose associations  Thought Content:  Denies SI (fluctuating passive SI through the day, but none at the moment), no intent or plan. no evidence of psychotic thought, no auditory hallucinations present and no visual hallucinations present  Insight:  fair  Judgement:  fair  Oriented to:  time, person, and place  Attention Span and Concentration:  fair  Recent and Remote Memory:  fair  Fund of Knowledge:  appropriate      Assessment & Plan       Principal Diagnosis:   MDD (major depressive disorder), recurrent severe, without psychosis vs. Bipolar 2 Disorder   Cannabis Use Disorder  R/o Alcohol Use Disorder     Assessment:   (Initial Assessment): Patient appears to have worsening depressive symptoms with suicidality. He has not been on psychotropic medications and has been \"self medicating\" with frequent cannabis and occasional episodic " binges of alcohol use. Along with depression, he also seems to have some symptoms of hypomania, with depression being the main component (with anergy, apathy, avolition, and negative self ruminations being primary symptoms). We discussed medications to help with some of these symptoms, including Effexor and Lamictal, both which he was open to take.      Update 6/14: Patient has been attending groups, and taking his medications. He talks about some some mild dissociative like events that occur at various times during the day, but does not seem anything wholly worrisome. He has continued depressive moods, but overall future oriented, pleasant ,and is willing to take any recommendations for     Update 6/17: Patient seems to have had worsened depressive/suicidal/manic like moods with effexor and lamictal. Today he seems more anxious, pressured, and with a flight of ideas. We discussed adding on Lithium to help stabilize his depressive, suicidal and manic moods, which he was open to. Risks/benefits discussed and dose was started.     Update 6/18: Tolerating medications. Better moods today. Social with peers, attending groups, and active in milieu.     Update 6/19: Continues to have mood fluctuations, and passive SI, but overall going in the right direction as he is participating in groups, social with peers, and maintaining a upbeat affect. Discussed benefit of continued structure post-hospitalization and recommended Day Program treatment, which he was open to.      Plan:  1.) Medication Plan:  - Lithium 300 mg BID   - Melatonin 3 mg HS     2.) Psychosocial Plan:  - Patient could benefit from individual therapy. Will also discuss partial/day program options.   - Discuss possible partial/day program options.      Legal:  Voluntary      Disposition:  Likely discharge in 2-3 days     Safety Assessment:   Checks: Status 15  Precautions: Suicide  Pt has not required locked seclusion or restraints in the past 24 hours to  maintain safety, please refer to RN documentation for further details.       The risks, benefits, alternatives and side effects have been discussed and are understood by the patient and other caregivers.         Attestation:  Patient has been seen and evaluated by me,  Jagdeep Soto MD

## 2019-06-19 NOTE — PLAN OF CARE
Problem: OT General Care Plan  Goal: OT Goal 1  Description  Pt will demonstrate increased self-cares to improve ADL/IADL completion during >2 scheduled OT groups within 1 week.      Groups Attended: OT Mental Health Management     Affect/Hygiene/Presentation: Calm/pleasant mood, bright affect. No apparent hygiene concerns.     Patient Performance/Response: Pt participated in a mental health management group focusing on Kathy's 5-tier motivational model of human needs. Pt was educated on the hierarchy of needs including physiological needs, safety needs, belongingness and love needs, esteem needs, and self-actualization. Pt verbalized/demonstrated understanding of the importance of each level, and how this motivational model relates to overall mental health needs including satisfaction with roles, habits, and routines. Pt verbalized feeling that they are currently working on both love/belonging and esteem needs, specifically relating to self-respect, self-compassion, and creativity.     Goal Progress: Goal initiated this date.     Plan: Pt will be encouraged to maintain group attendance for continued ongoing assessment and support in completion of occupational therapy treatment goals.     Outcome: Improving

## 2019-06-19 NOTE — PROGRESS NOTES
Case Management Note:    Writer scheduled the following:  Health Care Follow-up Appointments:   Therapy  Date and Time:  9:00am on Friday June 21st (Arrive at 8:30am for Paperwork)  Provider:  James Menjivar @ [facility]   Address:  [address] [place] [address]    Phone: [phone]  Bring Photo ID, any Co-Pay, and Insurance Card.  There is a 24 Hr Cancellation notice.     Medication Management  Date and Time:  3:20pm on Monday July 15th  Provider:  Anna Capps,  @ [facility]   Address:  [address] [place] [address]    Phone: [phone]  Bring Photo ID, any Co-Pay, and Insurance Card.  There is a 24 Hr Cancellation notice.

## 2019-06-19 NOTE — PROGRESS NOTES
06/19/19 1600   Group Therapy Session   Group Attendance attended group session   Total Time (minutes) 45   Group Type psychotherapeutic   Group Topic Covered   (group cohesion)   Patient Participation/Contribution cooperative with task;discussed personal experience with topic;listened actively;offered helpful suggestions to peers   Focus was communication and group cohesion. Pt actively participated in group, listened to others, and openly shared his responses to various questions. Presented as pleasant and engaged. Encouraging to others.

## 2019-06-19 NOTE — PLAN OF CARE
"Patient goal for today was to make a phone call to find out about his short term disability. He made the call and his short term disability is still pending. Patient reports increased depression today than yesterday. He rates depression at a 9 and anxiety at 6 on a scale of 1-10. He still having suicidal thoughts only, no plan or intent to act. He denies SIB/HI. No auditory or visual hallucinations. He states yesterday was a better day but today feels really down. He described his sleep as 'not good' though per report he had 7 hours of sleep last night. According to the patient, he kept waking up a lot last night even with melatonin and trazodone he still had hard time falling asleep. His mood has been calm today. He did attend and participated in groups. Patient is visible in milieu and socializes with peers appropriately. He describes concentration as not bad. He still has racing thoughts. Appetite has been good, Denies any pain. He takes medications without any problems and denies any side effects. His effective approach  includes listening to music and playing guitar. He states his main concern is that nobody has given him a fair diagnosis about his mental illness.     /69   Pulse 70   Temp 97.1  F (36.2  C) (Tympanic)   Resp 16   Ht 1.803 m (5' 11\")   Wt 74.9 kg (165 lb 1.6 oz)   SpO2 98%   BMI 23.03 kg/m      "

## 2019-06-19 NOTE — PROGRESS NOTES
"   06/18/19 2152   Behavioral Health   Hallucinations denies / not responding to hallucinations   Thinking intact   Orientation person: oriented;place: oriented;date: oriented;time: oriented   Memory baseline memory   Insight insight appropriate to situation   Judgement intact   Eye Contact at examiner   Affect full range affect   Mood mood is calm   Physical Appearance/Attire appears stated age;neat;attire appropriate to age and situation   Hygiene well groomed   Suicidality thoughts only   1. Wish to be Dead No   2. Non-Specific Active Suicidal Thoughts  Yes   4. Active Suicidal Ideation with Some Intent to Act, Without Specific Plan  No   5. Active Suicidal Ideation with Specific Plan and Intent  No   Change in Protective Factors? No   Enviromental Risk Factors None   Self Injury other (see comment)  (denies)   Elopement   (no indication of increased elopement risk)   Activity other (see comment)  (moderately sociable with peers)   Speech clear;coherent   Medication Sensitivity no stated side effects;no observed side effects   Psychomotor / Gait balanced;steady   Mathew spent much of the shift walking in the holly or sitting in the lounge. He was sociable with peers and polite in his interactions with staff and peers. He states that he is still feeling depressed but feels better than yesterday. He feels more calm. He believes that he was experiencing a negative reactions to Effexor, including irritability and constipation; now that he is no longer taking this medication, he reports that he is feeling better. He reports feeling suicidal \"sometimes but not much today\". He says \"I feel optimistic\" and believes that he is improving. His affect is calm and cheerful.   "

## 2019-06-20 PROCEDURE — 12400001 ZZH R&B MH UMMC

## 2019-06-20 PROCEDURE — H2032 ACTIVITY THERAPY, PER 15 MIN: HCPCS

## 2019-06-20 PROCEDURE — 99231 SBSQ HOSP IP/OBS SF/LOW 25: CPT | Performed by: PSYCHIATRY & NEUROLOGY

## 2019-06-20 PROCEDURE — 25000132 ZZH RX MED GY IP 250 OP 250 PS 637: Performed by: PSYCHIATRY & NEUROLOGY

## 2019-06-20 PROCEDURE — G0177 OPPS/PHP; TRAIN & EDUC SERV: HCPCS

## 2019-06-20 RX ORDER — TRAZODONE HYDROCHLORIDE 50 MG/1
50 TABLET, FILM COATED ORAL
Qty: 30 TABLET | Refills: 1 | Status: SHIPPED | OUTPATIENT
Start: 2019-06-20

## 2019-06-20 RX ORDER — POLYETHYLENE GLYCOL 3350 17 G
4 POWDER IN PACKET (EA) ORAL
Qty: 108 LOZENGE | Refills: 1 | Status: SHIPPED | OUTPATIENT
Start: 2019-06-20

## 2019-06-20 RX ORDER — POLYETHYLENE GLYCOL 3350 17 G/17G
17 POWDER, FOR SOLUTION ORAL DAILY PRN
Status: DISCONTINUED | OUTPATIENT
Start: 2019-06-20 | End: 2019-06-24 | Stop reason: HOSPADM

## 2019-06-20 RX ORDER — LANOLIN ALCOHOL/MO/W.PET/CERES
3 CREAM (GRAM) TOPICAL AT BEDTIME
Qty: 30 TABLET | Refills: 1 | Status: SHIPPED | OUTPATIENT
Start: 2019-06-20

## 2019-06-20 RX ORDER — LITHIUM CARBONATE 300 MG/1
300 TABLET, FILM COATED, EXTENDED RELEASE ORAL EVERY 12 HOURS
Qty: 60 TABLET | Refills: 1 | Status: SHIPPED | OUTPATIENT
Start: 2019-06-20 | End: 2019-06-24

## 2019-06-20 RX ORDER — HYDROXYZINE HYDROCHLORIDE 25 MG/1
25 TABLET, FILM COATED ORAL EVERY 4 HOURS PRN
Qty: 30 TABLET | Refills: 1 | Status: SHIPPED | OUTPATIENT
Start: 2019-06-20

## 2019-06-20 RX ADMIN — LITHIUM CARBONATE 300 MG: 300 TABLET, EXTENDED RELEASE ORAL at 08:58

## 2019-06-20 RX ADMIN — TRAZODONE HYDROCHLORIDE 50 MG: 50 TABLET ORAL at 21:49

## 2019-06-20 RX ADMIN — LITHIUM CARBONATE 300 MG: 300 TABLET, EXTENDED RELEASE ORAL at 21:49

## 2019-06-20 RX ADMIN — NICOTINE POLACRILEX 4 MG: 2 LOZENGE ORAL at 20:52

## 2019-06-20 RX ADMIN — ALUMINUM HYDROXIDE, MAGNESIUM HYDROXIDE, AND DIMETHICONE 30 ML: 400; 400; 40 SUSPENSION ORAL at 12:49

## 2019-06-20 RX ADMIN — MELATONIN TAB 3 MG 3 MG: 3 TAB at 21:49

## 2019-06-20 RX ADMIN — NICOTINE POLACRILEX 4 MG: 2 LOZENGE ORAL at 12:48

## 2019-06-20 RX ADMIN — NICOTINE POLACRILEX 4 MG: 2 LOZENGE ORAL at 17:03

## 2019-06-20 RX ADMIN — NICOTINE POLACRILEX 4 MG: 2 LOZENGE ORAL at 08:58

## 2019-06-20 RX ADMIN — NICOTINE POLACRILEX 4 MG: 2 LOZENGE ORAL at 15:08

## 2019-06-20 RX ADMIN — NICOTINE POLACRILEX 4 MG: 2 LOZENGE ORAL at 18:31

## 2019-06-20 ASSESSMENT — MIFFLIN-ST. JEOR: SCORE: 1723.3

## 2019-06-20 ASSESSMENT — ACTIVITIES OF DAILY LIVING (ADL)
ORAL_HYGIENE: INDEPENDENT
HYGIENE/GROOMING: INDEPENDENT
DRESS: SCRUBS (BEHAVIORAL HEALTH)
LAUNDRY: WITH SUPERVISION
HYGIENE/GROOMING: INDEPENDENT
DRESS: SCRUBS (BEHAVIORAL HEALTH);INDEPENDENT
LAUNDRY: WITH SUPERVISION
ORAL_HYGIENE: INDEPENDENT

## 2019-06-20 NOTE — PROGRESS NOTES
"[facility], [place]   Psychiatric Progress Note        Interim History:   Reason for Hospitalization:Mathew is a 29 year old male with history of depressive mood, alcohol and cannabis use who is admitted on a voluntary basis for worsening depressive moods with suicidal ideation.    Subjective: \"Doing ok today, hopeful these medications can help me\"    As per today's interview: Patient was pleasant and somewhat anxious up on approach. He has mostly improved moods, however some fluctuating mood that is gradually stabilizing. Mild passive SI, which has been improving. Tolerating [place]. Attending groups, which he feels is a good theraputic factor. Is planning his outpatient services, and follow ups, and getting Li level and plan to discharge on Monday 6/24.          Medications:       lithium ER  300 mg Oral Q12H SETH     melatonin  3 mg Oral At Bedtime          Allergies:     Allergies   Allergen Reactions     No Known Drug Allergies           Labs:     No results found for this or any previous visit (from the past 48 hour(s)).       Psychiatric Examination:   /70   Pulse 70   Temp 97.4  F (36.3  C) (Tympanic)   Resp 16   Ht 1.803 m (5' 11\")   Wt 74.6 kg (164 lb 8 oz)   SpO2 98%   BMI 22.94 kg/m    Weight is 164 lbs 8 oz  Body mass index is 22.94 kg/m .    Appearance: White male. Moderately well groomed. In hospital scrubs. No acute distress.   Attitude:  cooperative  Eye Contact:  Intermittent    Mood:  depressed  Affect:  intensity is blunted  Speech:  clear, coherent, however speaks more rapidly and more anxious compared to before.   Language: fluent and intact in English  Psychomotor, Gait, Musculoskeletal:  no evidence of tardive dyskinesia, dystonia, or tics  Throught Process:  logical, linear and goal oriented  Associations:  no loose associations  Thought Content:  Denies SI (fluctuating passive SI through the day, but none at the moment), no intent or plan. no evidence " "of psychotic thought, no auditory hallucinations present and no visual hallucinations present  Insight:  fair  Judgement:  fair  Oriented to:  time, person, and place  Attention Span and Concentration:  fair  Recent and Remote Memory:  fair  Fund of Knowledge:  appropriate      Assessment & Plan       Principal Diagnosis:   MDD (major depressive disorder), recurrent severe, without psychosis vs. Bipolar 2 Disorder   Cannabis Use Disorder  R/o Alcohol Use Disorder     Assessment:   (Initial Assessment): Patient appears to have worsening depressive symptoms with suicidality. He has not been on psychotropic medications and has been \"self medicating\" with frequent cannabis and occasional episodic binges of alcohol use. Along with depression, he also seems to have some symptoms of hypomania, with depression being the main component (with anergy, apathy, avolition, and negative self ruminations being primary symptoms). We discussed medications to help with some of these symptoms, including Effexor and Lamictal, both which he was open to take.      Update 6/14: Patient has been attending groups, and taking his medications. He talks about some some mild dissociative like events that occur at various times during the day, but does not seem anything wholly worrisome. He has continued depressive moods, but overall future oriented, pleasant ,and is willing to take any recommendations for     Update 6/17: Patient seems to have had worsened depressive/suicidal/manic like moods with effexor and lamictal. Today he seems more anxious, pressured, and with a flight of ideas. We discussed adding on Lithium to help stabilize his depressive, suicidal and manic moods, which he was open to. Risks/benefits discussed and dose was started.     Update 6/18: Tolerating medications. Better moods today. Social with peers, attending groups, and active in milieu.     Update 6/19: Continues to have mood fluctuations, and passive SI, but overall " going in the right direction as he is participating in groups, social with peers, and maintaining a upbeat affect. Discussed benefit of continued structure post-hospitalization and recommended Day Program treatment, which he was open to.      Update 6/20: Patient is showing continued improvement and more stability in his mood. Fluctuating SI has improved today. Will continue hospitalization to further stabilize his mood and SI, check Li level over the weekend (which will be important going forward), plan out his outpatient follow up/therapy plans. Plan to discharge on Monday 6/24.     Plan:  1.) Medication Plan:  - Lithium 300 mg BID   - Melatonin 3 mg HS     2.) Psychosocial Plan:  - Patient could benefit from individual therapy. Will also discuss partial/day program options.   - Discuss possible partial/day program options.      Legal:  Voluntary      Disposition:  Discharge on Monday 6/24.     Safety Assessment:   Checks: Status 15  Precautions: Suicide  Pt has not required locked seclusion or restraints in the past 24 hours to maintain safety, please refer to RN documentation for further details.       The risks, benefits, alternatives and side effects have been discussed and are understood by the patient and other caregivers.         Attestation:  Patient has been seen and evaluated by me,  Jagdeep Soto MD

## 2019-06-20 NOTE — PROGRESS NOTES
06/20/19 1500   Psycho Education   Type of Intervention structured groups   Response participates, initiates socially appropriate   Hours 1   Patient was a participant in group about Basic human rights and responsibilities where patients identified three areas of difficulty with their own rights and three difficulties they have with responsibilities to others. Patient was appropriate and active contributor

## 2019-06-20 NOTE — PLAN OF CARE
BEHAVIORAL TEAM DISCUSSION    Participants: Jagdeep Soto MD, Damaris Del Toro RN, Irma Fisher OT, Candy Madison CTC  Progress: Much improved, preparing for discharge-hopefully early next week; lithium labs on Sat, meeting with Day Treatment intake this morning; will be taking time off work and can follow up with his OP med mgr;   Continued Stay Criteria/Rationale: stabilization  Medical/Physical: Will continue to monitor and assess  Precautions:   Behavioral Orders   Procedures    Code 1 - Restrict to Unit    Routine Programming     As clinically indicated    Status 15     Every 15 minutes.    Suicide precautions     Patients on Suicide Precautions should have a Combination Diet ordered that includes a Diet selection(s) AND a Behavioral Tray selection for Safe Tray - with utensils, or Safe Tray - NO utensils       Plan: [place] labs sat, discharge Monday if Stable; day treatment and appt with OP medication mgr  Rationale for change in precautions or plan: N/A

## 2019-06-20 NOTE — PROGRESS NOTES
"Pt spent much of the shift walking in the holly with peer and attending groups offered.He was sociable with peers and polite in his interactions with staff and peers. Pt did not endorse any mental health symptoms. When asked how's he's feeling and responded by saying \" I feel pretty positive today and starting to feel like my old self\" . Pt denies any thoughts of SI/SIB/AV/HI. Pt is feeling very hopeful due to cares and medication given. Pt been nervous about his short-term disability  Through his work but got news today that he qualifies for. Pt wants to talk to his  to see where they can find him placement within the [place] area since [place]. No other concerns at this time. Pt been in a very happy mood.    Affect: Calm and smiling  Concentration: intact/good  Appetite: back to normal /eating healthy  Sleep: Better  Pain: None     06/20/19 1354   Behavioral Health   Hallucinations denies / not responding to hallucinations   Thinking intact   Orientation time: oriented;date: oriented;place: oriented;person: oriented   Memory short term   Insight insight appropriate to situation;insight appropriate to events   Judgement intact   Eye Contact at examiner   Affect   (positive)   Mood mood is calm   1. Wish to be Dead No   2. Non-Specific Active Suicidal Thoughts  No   Occupational Therapy   Type of Intervention structured groups   Response Participates   Hours 2   Treatment Detail Pt played the guitar to stay calm   Activities of Daily Living   Hygiene/Grooming independent   Oral Hygiene independent   Dress scrubs (behavioral health)   Laundry with supervision   Room Organization independent     "

## 2019-06-20 NOTE — PROGRESS NOTES
"   06/19/19 2231   Behavioral Health   Hallucinations denies / not responding to hallucinations   Thinking distractable   Orientation person: oriented;place: oriented;date: oriented;time: oriented   Memory baseline memory   Insight admits / accepts   Judgement intact   Eye Contact into space;at examiner   Affect other (see comments)  (mid range)   Mood mood is calm   Physical Appearance/Attire appears younger than stated age;attire appropriate to age and situation   Hygiene well groomed   Suicidality thoughts only   1. Wish to be Dead Yes  (fleeting)   Duration (Lifetime) 2   Change in Protective Factors? No   Enviromental Risk Factors None   Self Injury other (see comment)  (denies)   Elopement   (none)   Activity restless   Speech clear;coherent   Medication Sensitivity no stated side effects;no observed side effects   Psychomotor / Gait balanced;steady   Daily Care   Activity up ad tobin   Patient Performed Hygiene other (see comments)  (per pt)   Activities of Daily Living   Hygiene/Grooming independent   Oral Hygiene independent   Dress independent   Laundry with supervision   Room Organization independent     Pt states that depression began day at 8 or 9 but now \"sits at 3 with little waves up to 5 but not for long\" with little to no anxiety. Pt reports extremely fleeting SI earlier in the shift but now feels hopeful. Pt denies SIB and hallucinations. Pt states that new medications seem to be working and there is no longer unwanted side effects or inability to concentrate. Pt is now able to read and was seen active on the milieu acting much less tense than previously observed. Pt has discussed discharge and aftercare with doctor and says that not only do they feel better than before they were admitted to the unit, but they \"feel more excited about their life than they have in a long time.\" Pt is happy with support from family and friends and feels they have the tools they need to make progress after discharge.  "

## 2019-06-20 NOTE — PROGRESS NOTES
"Participated in Music Therapy group with focus on mood elevation, validation and decreasing anxiety and improved group cohesiveness. Engaged and cooperative in music listening interventions.   Showed progress in session goals.    Of note:  After session, patient did approach writer and asked \"would you ever like to play music together?\"  Writer politely declined, and affirmed that he should definitely keep playing guitar himself, though.    On exiting the unit, patient approached writer again by the doorway, this time with a folded piece of paper saying \"here-in case you ever want this.\"  Writer politely declined again, and exited the unit without taking the paper.    "

## 2019-06-20 NOTE — PROGRESS NOTES
Case Management Note:    Health Care Follow-up Appointments:   Therapy  Date and Time: 10:00am on July 1st (arrive 15 minutes early for paperwork)  Provider: Ellen ROSE at [facility]  [place]  Address:  [facility]   [address], [address], [place] [id number]  Phone: [phone]     Please note that if after the first appointment you decide this provider is not a great fit, please ask to be scheduled with another provider and they are happy to accommodate.       Medication Management  Date and Time:  3:20pm on Monday July 15th  Provider:  Anna Capps,  @ [facility]   Address:  [address] [place] [address]    Phone: [phone]  Bring Photo ID, any Co-Pay, and Insurance Card.  There is a 24 Hr Cancellation notice.    Patient will discharge on Monday 6/24/19.  AVS is complete

## 2019-06-21 PROCEDURE — 25000132 ZZH RX MED GY IP 250 OP 250 PS 637: Performed by: PSYCHIATRY & NEUROLOGY

## 2019-06-21 PROCEDURE — 12400001 ZZH R&B MH UMMC

## 2019-06-21 PROCEDURE — G0177 OPPS/PHP; TRAIN & EDUC SERV: HCPCS

## 2019-06-21 RX ADMIN — NICOTINE POLACRILEX 4 MG: 2 LOZENGE ORAL at 21:01

## 2019-06-21 RX ADMIN — NICOTINE POLACRILEX 4 MG: 2 LOZENGE ORAL at 19:52

## 2019-06-21 RX ADMIN — LITHIUM CARBONATE 300 MG: 300 TABLET, EXTENDED RELEASE ORAL at 19:52

## 2019-06-21 RX ADMIN — NICOTINE POLACRILEX 4 MG: 2 LOZENGE ORAL at 12:53

## 2019-06-21 RX ADMIN — NICOTINE POLACRILEX 4 MG: 2 LOZENGE ORAL at 08:55

## 2019-06-21 RX ADMIN — LITHIUM CARBONATE 300 MG: 300 TABLET, EXTENDED RELEASE ORAL at 08:55

## 2019-06-21 RX ADMIN — NICOTINE POLACRILEX 4 MG: 2 LOZENGE ORAL at 16:40

## 2019-06-21 RX ADMIN — MELATONIN TAB 3 MG 3 MG: 3 TAB at 22:20

## 2019-06-21 ASSESSMENT — ACTIVITIES OF DAILY LIVING (ADL)
HYGIENE/GROOMING: INDEPENDENT
HYGIENE/GROOMING: INDEPENDENT
DRESS: SCRUBS (BEHAVIORAL HEALTH)
DRESS: INDEPENDENT
ORAL_HYGIENE: INDEPENDENT
LAUNDRY: WITH SUPERVISION
ORAL_HYGIENE: INDEPENDENT

## 2019-06-21 NOTE — PLAN OF CARE
"  Problem: OT General Care Plan  Goal: OT Goal 1  Description  Pt will demonstrate increased self-cares to improve ADL/IADL completion during >2 scheduled OT groups within 1 week.      Groups Attended: OT Clinic, Sensory     Affect/Hygiene/Presentation: Calm/pleasant mood, engaged, congruent affect. No apparent hygiene concerns.     Patient Performance/Response:  -Clinic: Pt continues to demonstrate consistent performance skills/patterns during engagement in occupational therapy clinic. See prior notes for details.   -Sensory: Pt actively participated in a sensory calming group focused on learning to cope and self-soothe through the sensory system. After initial sensory education was provided by writer, Pt was able to independently identify their preferred methods of using movement, sight, hearing, taste, touch, and smell to calm self as needed including \"playing the guitar, petting a cat, chopping wood, drinking tea, and cooking\". Pt then engaged in a hands-on task involving creating their own essential oil inhalers. Pt was educated on safe and proper use of the inhaler, and utilized peppermint essential oil. Pt verbalized that he will begin using a sound machine during meditation to increase the benefits, and that he will engage in more physical and routine activities upon discharge.     Plan: Pt will be encouraged to maintain group attendance for continued ongoing assessment and support in completion of occupational therapy treatment goals.     Outcome: Improving       "

## 2019-06-21 NOTE — PROGRESS NOTES
Pt was calm, cooperative, and pleasant. Affect was bright. He was present in the milieu and was seen walking up and down the holly with another peer - chatting appropriately. He ate dinner. His appetite and sleep are good (said the sleep meds are helping). No pain, no side effects. He reported that he will have small depressive episodes, but are not as bad as before. Pt went to music therapy and participated. He reported that music, cooking, self-reflection, and his meds are effective strategies for him to stay well. Pt feels ready to go home, he thinks he will on Monday and is hopeful. His goal is to find outpatient services in the [facility] close to his home - wants to chat to the  about this on Friday (tomorrow).       06/20/19 2112   Behavioral Health   Hallucinations denies / not responding to hallucinations   Thinking intact   Orientation person: oriented;place: oriented;date: oriented;time: oriented   Memory baseline memory   Insight insight appropriate to situation   Judgement intact   Eye Contact at examiner   Affect full range affect   Mood mood is calm   Physical Appearance/Attire attire appropriate to age and situation   Hygiene well groomed   Suicidality other (see comments)  (denied)   1. Wish to be Dead No   2. Non-Specific Active Suicidal Thoughts  No   Self Injury other (see comment)  (denied)   Elopement   (nothing observed this shift)   Activity other (see comment)  (present in milieu)   Speech clear;coherent   Medication Sensitivity no stated side effects   Psychomotor / Gait balanced;steady   Coping/Psychosocial   Verbalized Emotional State acceptance;depression;hopefulness   Plan of Care Reviewed With patient   Psycho Education   Type of Intervention 1:1 intervention   Response participates with encouragement   Hours 0.5   Treatment Detail 1:1 check in   Activities of Daily Living   Hygiene/Grooming independent   Oral Hygiene independent   Dress scrubs (behavioral  health);independent   Laundry with supervision   Room Organization independent   Groups   Details attended music therapy

## 2019-06-21 NOTE — PLAN OF CARE
"48 hour nursing assessment:  Pt has had good shift. Pt has been social and active in the milieu. Pt states \"this is the least depressed that I have felt in a long time\". Pt denies SI and SIB. Denies anxiety. Pt states that he is ready to discharge on Monday, but is a little worried about getting a note for his job. This writer explained to him that the doctor would write a note for him to take to his jobs.   "

## 2019-06-21 NOTE — PLAN OF CARE
Problem: OT General Care Plan  Goal: OT Goal 1  Description  Pt will demonstrate increased self-cares to improve ADL/IADL completion during >2 scheduled OT groups within 1 week.      Groups Attended: OT Nutrition/Cooking group x2, OT Clinic group     Affect/Hygiene/Presentation: Calm/pleasant mood, engaged, congruent affect. No apparent hygiene concerns.     Patient Performance/Response:  -Nutrition/Cooking: Pt actively participated in a two-part group focusing on nutrition and healthy meal preparation. Pt demonstrated adequate knowledge of general nutrition, healthy gut bacteria, nutrients and vitamins, and serotonin levels with diet and exercise. After this education was provided through a social/leisure task, Pt was able to verbalize the importance of nutrition/diet and exercise in relation to mental health. Pt was also able to identify the benefits of healthy meal preparation, and completed the hands-on task safety with minimal assistance from staff.   -Clinic: Pt actively participated in occupational therapy clinic. Pt was able to ask for assistance as needed, and independently initiate a familiar, creative expression task without difficulty. Pt demonstrated good focus, planning, and problem solving during task completion. Pt appeared comfortable interacting with peers and writer, and socialized throughout group.     Plan: Pt will be encouraged to maintain group attendance for continued ongoing assessment and support in completion of occupational therapy treatment goals.     Outcome: Improving

## 2019-06-21 NOTE — PROGRESS NOTES
Writer meet with pt to discuss discharge plans/appointments. His friend will pick him up on Monday.

## 2019-06-22 LAB — LITHIUM SERPL-SCNC: 0.48 MMOL/L (ref 0.6–1.2)

## 2019-06-22 PROCEDURE — 12400001 ZZH R&B MH UMMC

## 2019-06-22 PROCEDURE — 25000132 ZZH RX MED GY IP 250 OP 250 PS 637: Performed by: PSYCHIATRY & NEUROLOGY

## 2019-06-22 PROCEDURE — 36415 COLL VENOUS BLD VENIPUNCTURE: CPT | Performed by: PSYCHIATRY & NEUROLOGY

## 2019-06-22 PROCEDURE — 80178 ASSAY OF LITHIUM: CPT | Performed by: PSYCHIATRY & NEUROLOGY

## 2019-06-22 RX ADMIN — TRAZODONE HYDROCHLORIDE 50 MG: 50 TABLET ORAL at 23:22

## 2019-06-22 RX ADMIN — NICOTINE POLACRILEX 4 MG: 2 LOZENGE ORAL at 17:14

## 2019-06-22 RX ADMIN — NICOTINE POLACRILEX 4 MG: 2 LOZENGE ORAL at 20:49

## 2019-06-22 RX ADMIN — NICOTINE POLACRILEX 2 MG: 2 LOZENGE ORAL at 22:04

## 2019-06-22 RX ADMIN — MELATONIN TAB 3 MG 3 MG: 3 TAB at 22:02

## 2019-06-22 RX ADMIN — LITHIUM CARBONATE 300 MG: 300 TABLET, EXTENDED RELEASE ORAL at 08:41

## 2019-06-22 RX ADMIN — NICOTINE POLACRILEX 4 MG: 2 LOZENGE ORAL at 08:41

## 2019-06-22 RX ADMIN — NICOTINE POLACRILEX 4 MG: 2 LOZENGE ORAL at 15:10

## 2019-06-22 RX ADMIN — LITHIUM CARBONATE 300 MG: 300 TABLET, EXTENDED RELEASE ORAL at 20:07

## 2019-06-22 RX ADMIN — NICOTINE POLACRILEX 4 MG: 2 LOZENGE ORAL at 19:18

## 2019-06-22 ASSESSMENT — ACTIVITIES OF DAILY LIVING (ADL)
DRESS: INDEPENDENT
HYGIENE/GROOMING: INDEPENDENT
LAUNDRY: WITH SUPERVISION
ORAL_HYGIENE: INDEPENDENT
ORAL_HYGIENE: INDEPENDENT
DRESS: INDEPENDENT
LAUNDRY: WITH SUPERVISION
HYGIENE/GROOMING: INDEPENDENT

## 2019-06-22 NOTE — PROGRESS NOTES
Patient is calm and cooperative. He had a good day. He has been eating and sleeping well. He is very positive and reports that he is doing better every day. He rated depression at 5 and anxiety at 3. He denies SI/SIB today and seems hopeful for recovery. He has been social with others and out in the milieu. No other concerns at this time.

## 2019-06-22 NOTE — PLAN OF CARE
"Problem: Depressive Symptoms  Goal: Depressive Symptoms  Description  Signs and symptoms of listed problems will be absent or manageable.  1) Pt will remain safe without any self harm during hospitalization.  2) Patient will have decreased thoughts of self harm and/or suicidal ideation  3) Patient will report improved sleep and feeling rested upon waking.  4) Patient will have adequate daily oral intake.  5) Patient will have improvement in self-care, including personal hygiene.  6) Patient will verbalize and demonstrate an ability to cope for their age.  7) Patient will be able to participate and initiate activities and conversation with others.  8) Patient will discuss feelings of hopelessness and and express an interest in the future.  9) By discharge the patient will report an increase in sense of control over their current situation.(i.e by verbalizing coping techniques to help get their life back on track and/or naming people they can talk to when they need emotional assistance).    6/21/2019 1922 by Jarret Rios, RN  Outcome: Improving    Pt visible in the milieu and seen pacing in the hallways with his peer. Pt is polite and cooperative. Pt expressed himself thoroughly during check-in. Poor concentration- pt would ramble at times and would have to pause to refocus our conversation. Endorses anxiety and depression. Rates his depression a 2/10 and and anxiety a 3/4 out of 10. Pt feels he does not have an issue with anxiety normally and relates it to discharge on Monday. He feels \"restless\" and does not want the weekend to drag on. Pt's insight is improving and he was able to reflect back on his mental state at admission and what he hopes to do for himself with outpatient appointments. Good appetite and sleep. Denies any SE's with his medication. Pt states his medication is starting to help lift his depression and feels more positive again. Denies any pain. Will continue to monitor and offer support. " "    Problem: Suicidal Behavior  Goal: Suicidal Behavior is Absent or Managed  6/21/2019 1922 by Jarret Rios, RN  Outcome: Improving    Denies SI/SIB/hallucinations and thoughts of harming others.     Vital signs:  Temp: 99.2  F (37.3  C) Temp src: Oral BP: 127/66 Pulse: 98           Height: 180.3 cm (5' 11\") Weight: 74.6 kg (164 lb 8 oz)          "

## 2019-06-23 PROCEDURE — 12400001 ZZH R&B MH UMMC

## 2019-06-23 PROCEDURE — 25000132 ZZH RX MED GY IP 250 OP 250 PS 637: Performed by: PSYCHIATRY & NEUROLOGY

## 2019-06-23 RX ADMIN — NICOTINE POLACRILEX 4 MG: 2 LOZENGE ORAL at 18:46

## 2019-06-23 RX ADMIN — NICOTINE POLACRILEX 4 MG: 2 LOZENGE ORAL at 16:10

## 2019-06-23 RX ADMIN — MELATONIN TAB 3 MG 3 MG: 3 TAB at 22:26

## 2019-06-23 RX ADMIN — NICOTINE POLACRILEX 4 MG: 2 LOZENGE ORAL at 09:03

## 2019-06-23 RX ADMIN — TRAZODONE HYDROCHLORIDE 50 MG: 50 TABLET ORAL at 22:26

## 2019-06-23 RX ADMIN — LITHIUM CARBONATE 300 MG: 300 TABLET, EXTENDED RELEASE ORAL at 09:03

## 2019-06-23 RX ADMIN — LITHIUM CARBONATE 300 MG: 300 TABLET, EXTENDED RELEASE ORAL at 20:18

## 2019-06-23 RX ADMIN — NICOTINE POLACRILEX 4 MG: 2 LOZENGE ORAL at 21:06

## 2019-06-23 RX ADMIN — NICOTINE POLACRILEX 4 MG: 2 LOZENGE ORAL at 11:34

## 2019-06-23 ASSESSMENT — ACTIVITIES OF DAILY LIVING (ADL)
DRESS: INDEPENDENT
DRESS: INDEPENDENT
HYGIENE/GROOMING: INDEPENDENT
HYGIENE/GROOMING: INDEPENDENT
LAUNDRY: WITH SUPERVISION
ORAL_HYGIENE: INDEPENDENT
ORAL_HYGIENE: INDEPENDENT
LAUNDRY: UNABLE TO COMPLETE

## 2019-06-23 ASSESSMENT — MIFFLIN-ST. JEOR: SCORE: 1727.38

## 2019-06-23 NOTE — PROGRESS NOTES
"Pt had a really good evening. He spent most of the shift in the milieu, engaging with fellow pts on the unit, and watching a movie with peers. He has a bright affect. No pressured speech or hyperactivity/impulsiveness. During check-in, pt reported feeling a lot better since his admission. He mentioned that the first round of medications that were tried during his stay made him feel worse, and is therefore very happy with the medication he is currently taking. Pt rates his depression at a 2/10 and his anxiety at a 4/10. He denies having any suicidal thoughts today. He is future-oriented, and looking forward to returning home and \"hopefully buying a cat\". He states that he still has time off of work to relax before returning to his normal routine. He seems to have a solid support system to help if he should need it. Denies SI/SIB/HI. Denies AVH.          06/22/19 2100   Behavioral Health   Hallucinations denies / not responding to hallucinations   Thinking intact   Orientation person: oriented;place: oriented;date: oriented;time: oriented   Memory baseline memory   Insight admits / accepts   Judgement intact   Eye Contact at examiner   Affect full range affect   Mood mood is calm   Physical Appearance/Attire neat   Hygiene well groomed   Suicidality other (see comments)  (Denies)   1. Wish to be Dead No   2. Non-Specific Active Suicidal Thoughts  No   Self Injury other (see comment)  (Denies)   Activity other (see comment)  (Present in milieu, engaged)   Speech clear;coherent   Medication Sensitivity no stated side effects;no observed side effects   Psychomotor / Gait steady;balanced   Activities of Daily Living   Hygiene/Grooming independent   Oral Hygiene independent   Dress independent   Laundry with supervision   Room Organization independent         "

## 2019-06-23 NOTE — PLAN OF CARE
"Patient's goal for today was talk to boss. He has met this goal. Patient rates depression at a 1 and anxiety at a 3.   Patient denies current or recent suicidal ideation or behaviors. and denies current or recent self injurious behavior or ideation. Patient also denies auditory and visual hallucinations. He is looking forward to discharge. He is thankful that he has a job that allows for him to be off and has short term disability     Appearance/Behavior: No apparent distress  Speech: Normal  Mood/Affect: normal affect  Insight: Poor  Patient has  been attending to ADLs, eating well, sleeping well    Patient has not required PRN medication this shift for:not applicable   Medication side effects seen this shift: none    Vital signs:  Temp: 96  F (35.6  C) Temp src: Tympanic BP: 124/84 Pulse: 70           Height: 180.3 cm (5' 11\") Weight: 75 kg (165 lb 6.4 oz)  Estimated body mass index is 23.07 kg/m  as calculated from the following:    Height as of this encounter: 1.803 m (5' 11\").    Weight as of this encounter: 75 kg (165 lb 6.4 oz).         "

## 2019-06-24 VITALS
HEART RATE: 72 BPM | BODY MASS INDEX: 23.15 KG/M2 | TEMPERATURE: 98.3 F | DIASTOLIC BLOOD PRESSURE: 84 MMHG | SYSTOLIC BLOOD PRESSURE: 124 MMHG | RESPIRATION RATE: 16 BRPM | WEIGHT: 165.4 LBS | OXYGEN SATURATION: 99 % | HEIGHT: 71 IN

## 2019-06-24 PROCEDURE — 25000132 ZZH RX MED GY IP 250 OP 250 PS 637: Performed by: PSYCHIATRY & NEUROLOGY

## 2019-06-24 PROCEDURE — G0177 OPPS/PHP; TRAIN & EDUC SERV: HCPCS

## 2019-06-24 PROCEDURE — 90853 GROUP PSYCHOTHERAPY: CPT

## 2019-06-24 RX ORDER — LITHIUM CARBONATE 300 MG/1
TABLET, FILM COATED, EXTENDED RELEASE ORAL
Qty: 90 TABLET | Refills: 1 | Status: SHIPPED | OUTPATIENT
Start: 2019-06-24

## 2019-06-24 RX ADMIN — NICOTINE POLACRILEX 4 MG: 2 LOZENGE ORAL at 14:27

## 2019-06-24 RX ADMIN — NICOTINE POLACRILEX 4 MG: 2 LOZENGE ORAL at 13:02

## 2019-06-24 RX ADMIN — LITHIUM CARBONATE 300 MG: 300 TABLET, EXTENDED RELEASE ORAL at 08:18

## 2019-06-24 RX ADMIN — NICOTINE POLACRILEX 4 MG: 2 LOZENGE ORAL at 08:18

## 2019-06-24 ASSESSMENT — ACTIVITIES OF DAILY LIVING (ADL)
LAUNDRY: UNABLE TO COMPLETE
HYGIENE/GROOMING: INDEPENDENT
DRESS: INDEPENDENT
ORAL_HYGIENE: INDEPENDENT

## 2019-06-24 NOTE — PROGRESS NOTES
Pt discharged home with friend. Denies SI/HI/SIB. Belongings accounted for and returned to pt. Verbalizes understanding of all discharge instructions, recommendations and medications.

## 2019-06-24 NOTE — PLAN OF CARE
Patient alert and oriented x4. Been visible in milieu, appropriately socializing with others. Patient attended and participated in groups. Patient to discharge home this evening. Writer went through discharge paperwork with patient. He verbalized understanding of discharge instructions including medications administrations. He rates depression at a 1 and anxiety at 3 on a scale of 1-10. He denies any psychotic symptoms. His concentration is back to baseline. He has racing thoughts a little bit but according to him that's normal. Patient states his sleep and appetite has been good and he has no pain. He denies any SE from medications. Patient denies SI/SIB/HI. He also denies AH/VH. He denies access to guns. He identifies his parents and his roommate as his support network. His coping skills include listening to music, playing guitar and meditation. He also stated he will be going cat shopping this weekend. Patient has been compliant with his medication. Patient's envelop was bought up from security and is in the safety box. His prescribed medications are locked up in the med room. His room mate will provide transportation,  between [phone].

## 2019-06-24 NOTE — DISCHARGE SUMMARY
Psychiatric Discharge Summary    Mathew Lainze MRN# [id number]   Age: 29 year old YOB: 1990     Date of Admission:  6/12/2019  Date of Discharge:  6/24/2019  Admitting Physician:  Jagdeep Soto MD  Discharge Physician:  Najma Dominguez MD (Contact: [phone])         Event Leading to Hospitalization:   Per H&P:    Mathew is a 29 year old male with history of depressive mood, alcohol and cannabis use who is admitted on a voluntary basis for worsening depressive moods with suicidal ideation. The patient talks openly about his symptoms, and describes ongoing feelings of sustained low moods, worthlessness, self critical thoughts, apathy, anergy and worsening suicidal ideation. Reports indicate that the patient had reportedly was weaving in and out of traffic and was worried that he was going to kill himself, which lead him to get inpatient help for his symptoms. The patient works, but has found it difficult to function effectively with poor focus and attention, and recurrent ruminations on negative self thoughts. He also has experienced times where he is uncharacteristically elevated in his thoughts, along with episodes of impulsive behavior, with fast moving thoughts. He has tried psychotropic medications in the past, but lead to unwanted side effects (sexual side effects and constipation apparently). He admits to going on episodic alcohol binges that last a day or two along with using cannabis daily to help him find some temporary relief from his symptoms. He currently is open to medications for his symptoms.           See Admission note by Jagdeep Soto MD on 6/13/19 for additional details.          Diagnoses:     MDD (major depressive disorder), recurrent severe, without psychosis vs. Bipolar 2 Disorder   Cannabis Use Disorder  R/o Alcohol Use Disorder          Labs:     Recent Results (from the past 336 hour(s))   Drug abuse screen 6 urine (tox)    Collection Time: 06/12/19   8:14 PM   Result Value Ref Range    Amphetamine Qual Urine Negative NEG^Negative    Barbiturates Qual Urine Negative NEG^Negative    Benzodiazepine Qual Urine Negative NEG^Negative    Cannabinoids Qual Urine Positive (A) NEG^Negative    Cocaine Qual Urine Negative NEG^Negative    Ethanol Qual Urine Negative NEG^Negative    Opiates Qualitative Urine Negative NEG^Negative   CBC with platelets differential    Collection Time: 06/14/19  7:27 AM   Result Value Ref Range    WBC 7.5 4.0 - 11.0 10e9/L    RBC Count 5.17 4.4 - 5.9 10e12/L    Hemoglobin 16.6 13.3 - 17.7 g/dL    Hematocrit 47.5 40.0 - 53.0 %    MCV 92 78 - 100 fl    MCH 32.1 26.5 - 33.0 pg    MCHC 34.9 31.5 - 36.5 g/dL    RDW 11.8 10.0 - 15.0 %    Platelet Count 213 150 - 450 10e9/L    Diff Method Automated Method     % Neutrophils 56.8 %    % Lymphocytes 31.0 %    % Monocytes 8.5 %    % Eosinophils 3.2 %    % Basophils 0.5 %    % Immature Granulocytes 0.0 %    Nucleated RBCs 0 0 /100    Absolute Neutrophil 4.3 1.6 - 8.3 10e9/L    Absolute Lymphocytes 2.3 0.8 - 5.3 10e9/L    Absolute Monocytes 0.6 0.0 - 1.3 10e9/L    Absolute Eosinophils 0.2 0.0 - 0.7 10e9/L    Absolute Basophils 0.0 0.0 - 0.2 10e9/L    Abs Immature Granulocytes 0.0 0 - 0.4 10e9/L    Absolute Nucleated RBC 0.0    Comprehensive metabolic panel    Collection Time: 06/14/19  7:27 AM   Result Value Ref Range    Sodium 139 133 - 144 mmol/L    Potassium 4.2 3.4 - 5.3 mmol/L    Chloride 107 94 - 109 mmol/L    Carbon Dioxide 25 20 - 32 mmol/L    Anion Gap 7 3 - 14 mmol/L    Glucose 88 70 - 99 mg/dL    Urea Nitrogen 15 7 - 30 mg/dL    Creatinine 0.99 0.66 - 1.25 mg/dL    GFR Estimate >90 >60 mL/min/[1.73_m2]    GFR Estimate If Black >90 >60 mL/min/[1.73_m2]    Calcium 8.6 8.5 - 10.1 mg/dL    Bilirubin Total 0.9 0.2 - 1.3 mg/dL    Albumin 4.1 3.4 - 5.0 g/dL    Protein Total 7.5 6.8 - 8.8 g/dL    Alkaline Phosphatase 60 40 - 150 U/L    ALT 26 0 - 70 U/L    AST 19 0 - 45 U/L   TSH with free T4 reflex  and/or T3 as indicated    Collection Time: 06/14/19  7:27 AM   Result Value Ref Range    TSH 0.46 0.40 - 4.00 mU/L   Comprehensive metabolic panel    Collection Time: 06/17/19  8:47 PM   Result Value Ref Range    Sodium 140 133 - 144 mmol/L    Potassium 4.5 3.4 - 5.3 mmol/L    Chloride 107 94 - 109 mmol/L    Carbon Dioxide 28 20 - 32 mmol/L    Anion Gap 5 3 - 14 mmol/L    Glucose 84 70 - 99 mg/dL    Urea Nitrogen 12 7 - 30 mg/dL    Creatinine 1.05 0.66 - 1.25 mg/dL    GFR Estimate >90 >60 mL/min/[1.73_m2]    GFR Estimate If Black >90 >60 mL/min/[1.73_m2]    Calcium 9.0 8.5 - 10.1 mg/dL    Bilirubin Total 0.4 0.2 - 1.3 mg/dL    Albumin 4.3 3.4 - 5.0 g/dL    Protein Total 7.7 6.8 - 8.8 g/dL    Alkaline Phosphatase 60 40 - 150 U/L    ALT 30 0 - 70 U/L    AST 18 0 - 45 U/L   CBC with platelets    Collection Time: 06/17/19  8:47 PM   Result Value Ref Range    WBC 7.4 4.0 - 11.0 10e9/L    RBC Count 5.30 4.4 - 5.9 10e12/L    Hemoglobin 16.9 13.3 - 17.7 g/dL    Hematocrit 48.8 40.0 - 53.0 %    MCV 92 78 - 100 fl    MCH 31.9 26.5 - 33.0 pg    MCHC 34.6 31.5 - 36.5 g/dL    RDW 11.2 10.0 - 15.0 %    Platelet Count 203 150 - 450 10e9/L   Lipase    Collection Time: 06/17/19  8:47 PM   Result Value Ref Range    Lipase 150 73 - 393 U/L   Lactic acid whole blood    Collection Time: 06/17/19  8:47 PM   Result Value Ref Range    Lactic Acid 0.5 (L) 0.7 - 2.0 mmol/L   Lithium level    Collection Time: 06/22/19  7:38 AM   Result Value Ref Range    Lithium Level 0.48 (L) 0.60 - 1.20 mmol/L            Consults:   Consultation during this admission received from internal medicine on 6/17/19:    Brief Medicine Note:     Internal medicine consulted for discoloration of the penis. Per thorough note by CHINEDU Frank, the foreskin is discolored a darker color than the rest of the penis. Per note, no defined borders and no redness. It is described as non-painful without itching or change in sensation. Similar skin changes have occurred in  "the past before patient was ever started on Lamictal. Consult was placed for internal medicine. Per d/w Dr. Soto, given similar presentation unrelated to Lamictal in the past, will defer consult at this time.  - Given new Lamictal, please urgently consult medicine if rash changes in nature (new redness, swelling, pain, skin flaking, drainage) or if rash is noted anywhere else on the body  - Suggest ongoing OP follow up as patient has done for this discoloration in the [facility]     Antoinette Fields PA-C  Internal Medicine [facility]         Hospital Course:   Mathew Lainez was admitted to [facility] with attending Jagdeep Soto MD as a voluntary patient. The patient was placed under status 15 (15 minute checks) to ensure patient safety.     Per Dr Soto's progress note dated 6/20/19:    Assessment:   (Initial Assessment): Patient appears to have worsening depressive symptoms with suicidality. He has not been on psychotropic medications and has been \"self medicating\" with frequent cannabis and occasional episodic binges of alcohol use. Along with depression, he also seems to have some symptoms of hypomania, with depression being the main component (with anergy, apathy, avolition, and negative self ruminations being primary symptoms). We discussed medications to help with some of these symptoms, including Effexor and Lamictal, both which he was open to take.       Update 6/14: Patient has been attending groups, and taking his medications. He talks about some some mild dissociative like events that occur at various times during the day, but does not seem anything wholly worrisome. He has continued depressive moods, but overall future oriented, pleasant ,and is willing to take any recommendations for      Update 6/17: Patient seems to have had worsened depressive/suicidal/manic like moods with effexor and lamictal. Today he seems more anxious, pressured, and with a flight of ideas. We discussed adding on Lithium to " help stabilize his depressive, suicidal and manic moods, which he was open to. Risks/benefits discussed and dose was started.      Update 6/18: Tolerating medications. Better moods today. Social with peers, attending groups, and active in milieu.      Update 6/19: Continues to have mood fluctuations, and passive SI, but overall going in the right direction as he is participating in groups, social with peers, and maintaining a upbeat affect. Discussed benefit of continued structure post-hospitalization and recommended Day Program treatment, which he was open to.      Update 6/20: Patient is showing continued improvement and more stability in his mood. Fluctuating SI has improved today. Will continue hospitalization to further stabilize his mood and SI, check Li level over the weekend (which will be important going forward), plan out his outpatient follow up/therapy plans. Plan to discharge on Monday 6/24.     Writer met with patient on Monday, 6/24. At that time, patient notes overall significant improvement in his mood and complete resolution of SI for the past 5 days. He notes that this has been the longest period of absence of SI for months as he experienced passive SI at baseline. He denies HI. He denies all side effects from Lithium. Discussed low Lithium level checked on 6/22 at 0.48. Lithium was increased from 600 mg daily to 900 mg daily on day of discharge for this reason. He was instructed to check [place] level at [facility] on 6/28, and agreed to do so. Again discussed R/B/A of Lithium, including possible Lithium toxicity if not monitored closely, or if used with NSAIDs/other drug interactions. He expressed understanding.     Mathew Lainez did participate in groups and was visible in the milieu.     The patient's symptoms of depression improved. SI resolved.     Mathew Lainez was released to home. At the time of discharge Mathew Lainez was determined to not be a danger to himself or  others.     Today Mathew Lainez denies SI, SIB, and HI. Patient agreed to call 911/present to ED if any imminent safety concerns arise, including re-emergence of SI. Patient was provided with crisis resources at the time of discharge. Patient agreed to further reduce risk of self-harm by completely abstaining from illicit substances and alcohol, and agreed to remain medication adherent. Expressed understanding of the risks associated with alcohol use, illicit drug use, and medication non-adherence.           Discharge Medications:     Current Discharge Medication List      START taking these medications    Details   hydrOXYzine (ATARAX) 25 MG tablet Take 1 tablet (25 mg) by mouth every 4 hours as needed for anxiety  Qty: 30 tablet, Refills: 1    Associated Diagnoses: Bipolar 2 disorder (H)      lithium ER (LITHOBID) 300 MG CR tablet Take 1 tablet (300 mg) by mouth every 12 hours  Qty: 60 tablet, Refills: 1    Associated Diagnoses: Bipolar 2 disorder (H)      melatonin 3 MG tablet Take 1 tablet (3 mg) by mouth At Bedtime  Qty: 30 tablet, Refills: 1    Associated Diagnoses: Bipolar 2 disorder (H)      nicotine (COMMIT) 2 MG lozenge Place 2 lozenges (4 mg) inside cheek every hour as needed for other (nicotine withdrawal symptoms)  Qty: 108 lozenge, Refills: 1    Associated Diagnoses: Nicotine use disorder      traZODone (DESYREL) 50 MG tablet Take 1 tablet (50 mg) by mouth nightly as needed for sleep  Qty: 30 tablet, Refills: 1    Associated Diagnoses: Bipolar 2 disorder (H)                Psychiatric Examination:   Appearance:  awake, alert and adequately groomed  Attitude:  cooperative  Eye Contact:  good  Mood:  good  Affect:  appropriate and in normal range and mood congruent  Speech:  clear, coherent  Psychomotor Behavior:  no evidence of tardive dyskinesia, dystonia, or tics  Thought Process:  logical, linear and goal oriented  Associations:  no loose associations  Thought Content:  no evidence of suicidal  ideation or homicidal ideation and no evidence of psychotic thought  Insight:  good  Judgment:  intact  Oriented to:  time, person, and place  Attention Span and Concentration:  intact  Recent and Remote Memory:  intact  Language: Able to name objects, Able to repeat phrases and Able to read and write  Fund of Knowledge: appropriate  Muscle Strength and Tone: normal  Gait and Station: Normal         Discharge Plan:   Health Care Follow-up Appointments:   Therapy  Date and Time: 10:00am on July 1st (arrive 15 minutes early for paperwork)  Provider: Ellen ROSE at [facility]  Address:  [facility]   [address] [place] [address], [address], [place] [address]  Phone: [phone]     Please note that if after the first appointment you decide this provider is not a great fit, please ask to be scheduled with another provider and they are happy to accommodate.       Medication Management  Date and Time:  3:20pm on Monday July 15th  Provider:  Anna Capps,  @ [facility]   Address:  [address]    Phone: [phone]  Bring Photo ID, any Co-Pay, and Insurance Card.  There is a 24 Hr Cancellation notice.     Attend all scheduled appointments with your outpatient providers. Call at least 24 hours in advance if you need to reschedule an appointment to ensure continued access to your outpatient providers.   *PLEASE GO TO [facility] TO HAVE LITHIUM LEVEL CHECKED ON 6/28/19. PLEASE CHECK LITHIUM LEVEL 12 HOURS AFTER YOUR NIGHT-TIME DOSE AND DO NOT TAKE THE MORNING DOSE UNTIL AFTER BLOOD DRAW*  [place] level follow-up appointment:  6/28/19 at 10:00am.  [facility] clinic: [id number], [place] [address]  outpatient lab  INSTRUCTIONS FOR USE OF LITHIUM        DO:    - call clinic if you, or another provider, makes any medication changes  - call clinic if your use of ibuprofen (or similar) increases at all  -  call clinic if - call clinic if you experience any symptom listed below  - LABS:   obtain all labs as directed,  labs are done every 6 months on an ongoing basis;                 lithium level is drawn 5 days after starting lithium and after dose changes;                  labs also include kidney and thyroid function;                 all labs are drawn between 10 and 14 hours after your last dose (12hrs is ideal)        DO NOT:    - stop this med abruptly  - use street drugs or alcohol while being treated with lithium  - increase use of ibuprofen (or similar) without calling the clinic        TRY TO AVOID:    - any use of ibuprofen (or similar);  MAY use acetaminophen (Tylenol) for pain  - excessive sweating  - excessive salt intake  - use of over the counter herbal remedies        FOOD:  take with or without food but AVOID excessive salt intake        COMMON ADVERSE EFFECTS: acne, weight gain, nausea, tremor, increased urination, increased thirst, sedation, loose stools, blurring of memory and concentration        CALL CLINIC URGENTLY or EMERGENCY ROOM:  if you have any concerns, especially the following...  - blurred vision  - heart palpitations  - ear ringing  - excessive urination (especially at night)  OR  excessive thirst  - seizure  - kidney problems or any new medical problem  - thoughts of death or hurting yourself     - suspect Serotonin Syndrome:   confusion   tremor  severe headache  sweats  fever   funny feeling in muscles  need to pace / restlessness   severe nausea, vomiting  diarrhea        PAIN MEDICATION POSSIBLE DRUG INTERACTIONS:        NSAIDs                                                       AVOID USE  buprofen  oxaprozin (Daypro)  diclofenac (Arthrotec, Voltaren)  indomethacin (Indocin)  ketoprofen  (Orudis)  ketorolac (Toradol)  meloxicam (Mobic)  nabumetone (Relafen)  naproxen  piroxicam ( Feldene)  tolmetin (Tolectin)  celecoxib   fenoprofen  OK TO USE  sulindac  "(Clinoril)  aspirin  salsalate (Disalcid)  acetominophen    All can raise Li level very high (>50%), very fast     at variable dosing--even with prn use.     They inhibit renal clearance of lithium.             Clinical Action:    Avoid combining if possible but if adding lithium to a     scheduled NSAID regimen, monitor weekly for the    first few weeks.                                            These 4 drugs do NOT raise Li level     sulindac can decrease level by 50% initially,   then normalize      Major Treatments, Procedures and Findings:  You were provided with: a psychiatric assessment,   Phone:  [phone] / [phone] for medical stability, medication evaluation and/or management, group therapy and milieu management     Symptoms to Report: feeling more aggressive, increased confusion, losing more sleep, mood getting worse or thoughts of suicide     Early warning signs can include: increased depression or anxiety sleep disturbances increased thoughts or behaviors of suicide or self-harm  increased unusual thinking, such as paranoia or hearing voices     Safety and Wellness:  Take all medicines as directed.  Make no changes unless your doctor suggests them.      Follow treatment recommendations.  Refrain from alcohol and non-prescribed drugs.  Ask your support system to help you reduce your access to items that could harm yourself or others. If there is a concern for safety, call 911.     Resources:   [facility] Crisis: ?[phone]  Crisis Intervention: 548.612.8065 or 731-560-0958 (TTY: 213.825.4435).  Call anytime for help.  [employer] [place] on Mental Illness (www.[place].maki.org): 787.801.7721 or 875-287-4986.  Mental Health Consumer/Survivor Network of [place] (www.mhcsn.net): 585.577.8809 or 097-402-6086  Mental Health Association of [place] (www.mentalhealth.org): 510.139.7347 or 330-347-1507  Text 4 Life: txt \"LIFE\" to 41648 for immediate support and crisis intervention  Crisis " "text line: Text \"[place]\" to 229378. Free, confidential, 24/7.  Crisis Intervention: 239.644.9340 or 112-807-7292. Call anytime for help.      Lifestyle Adjustment:   1. Adjust your lifestyle to get enough sleep, relaxation, exercise and good nutrition.  Continue to develop healthy coping skills to decrease stress and promote a healthy  lifestyle.  2. Abstain from all substances of abuse.  3. Take medications as prescribed.  Please work with your doctor to discuss any concerns you have with your medications or side effects you may be experiencing.  4. Follow up with appointments as scheduled.       General Medication Instructions:   1. See your medication sheet(s) for instructions.   2. Take all medicines as directed.  Make no changes unless your doctor suggests them.   3. Go to all your doctor visits.  4. Be sure to have all your required lab tests. This way, your medicines can be refilled on time.  5. Do not use any drugs not prescribed by your doctor.     The treatment team has appreciated the opportunity to work with you.     Mathew  please take care and make your recovery a daily recovery.   If you have any questions or concerns our unit number is [phone]     If you would like to obtain any specific documentation regarding your hospitalization after your discharge, contact [place] Release of Information/Medical Records:  [phone]      Attestation:  The patient has been seen and evaluated by me,  Najma Dominguez MD    "

## 2019-06-24 NOTE — PROGRESS NOTES
06/23/19 2225   Behavioral Health   Hallucinations denies / not responding to hallucinations   Thinking intact   Orientation person: oriented;place: oriented;date: oriented;time: oriented   Memory baseline memory   Insight admits / accepts   Judgement intact   Eye Contact at examiner   Affect full range affect   Mood mood is calm   Physical Appearance/Attire attire appropriate to age and situation   Hygiene well groomed   Suicidality other (see comments)  (Denies)   1. Wish to be Dead No   2. Non-Specific Active Suicidal Thoughts  No   Self Injury other (see comment)  (Denies)   Elopement   (None observed)   Activity other (see comment)  (Visible in groups and the milieu)   Speech coherent;clear   Medication Sensitivity no stated side effects;no observed side effects   Psychomotor / Gait steady;balanced   Activities of Daily Living   Hygiene/Grooming independent   Oral Hygiene independent   Dress independent   Laundry unable to complete   Room Organization vicky Carmona had a calm and engaged shift this evening. He was observed socializing with many different people today. He spent the whole shift out of his room and went to groups, watched movies with peers, and played board games. He endorsed anxiety and depression, both rated 2/10. He said they are both much lower than when he was admitted and he feels optimistic and hopefull that he can keep feeling this good. He denied SI, SIB, HI, and hallucinations. He is sleeping well and his appetite is normal. He is discharging tomorrow night and spoke about his outpatient supports and appointments he has scheduled. He also isn't planning on going back to work right away and said that his employer has been very supportive and they are on the same page. There were no behavioral issues this evening.

## 2019-06-24 NOTE — DISCHARGE INSTRUCTIONS
Behavioral Discharge Planning and Instructions      Summary:  You were admitted on 6/12/2019 due to suicidal ideation.  You were treated by Jagdeep Soto MD and discharged on 6/24/19 from [facility]  to Home      Principal Diagnosis:   Major Depressive Disorder, Recurrent, severe; Anxiety Disorder;       Health Care Follow-up Appointments:   Therapy  Date and Time: 10:00am on July 1st (arrive 15 minutes early for paperwork)  Provider: Ellen ROSE at [facility]  Address:  [facility]   [address], [address], [place] [address]  Phone: [phone]    Please note that if after the first appointment you decide this provider is not a great fit, please ask to be scheduled with another provider and they are happy to accommodate.      Medication Management  Date and Time:  3:20pm on Monday July 15th  Provider:  Anna Capps,  @ [facility]   Address:  [address] [place] [address]    Phone: [phone]  Bring Photo ID, any Co-Pay, and Insurance Card.  There is a 24 Hr Cancellation notice.    Attend all scheduled appointments with your outpatient providers. Call at least 24 hours in advance if you need to reschedule an appointment to ensure continued access to your outpatient providers.   *PLEASE GO TO [facility] OUTPATIENT LAB TO HAVE LITHIUM LEVEL CHECKED ON 6/28/19. PLEASE CHECK LITHIUM LEVEL 12 HOURS AFTER YOUR NIGHT-TIME DOSE AND DO NOT TAKE THE MORNING DOSE UNTIL AFTER BLOOD DRAW*  [facility] level follow-up appointment:  6/28/19 at 10:00am.  [facility] clinic: [id number], [place] [address]  outpatient lab  INSTRUCTIONS FOR USE OF LITHIUM      DO:    - call clinic if you, or another provider, makes any medication changes  - call clinic if your use of ibuprofen (or similar) increases at all  - call clinic if - call clinic if you experience any symptom listed below  - LABS:   obtain all labs as directed,  labs are  done every 6 months on an ongoing basis;                 lithium level is drawn 5 days after starting lithium and after dose changes;                  labs also include kidney and thyroid function;                 all labs are drawn between 10 and 14 hours after your last dose (12hrs is ideal)      DO NOT:    - stop this med abruptly  - use street drugs or alcohol while being treated with lithium  - increase use of ibuprofen (or similar) without calling the clinic      TRY TO AVOID:    - any use of ibuprofen (or similar);  MAY use acetaminophen (Tylenol) for pain  - excessive sweating  - excessive salt intake  - use of over the counter herbal remedies      FOOD:  take with or without food but AVOID excessive salt intake      COMMON ADVERSE EFFECTS: acne, weight gain, nausea, tremor, increased urination, increased thirst, sedation, loose stools, blurring of memory and concentration      CALL CLINIC URGENTLY or EMERGENCY ROOM:  if you have any concerns, especially the following...  - blurred vision  - heart palpitations  - ear ringing  - excessive urination (especially at night)  OR  excessive thirst  - seizure  - kidney problems or any new medical problem  - thoughts of death or hurting yourself    - suspect Serotonin Syndrome:   confusion   tremor  severe headache  sweats  fever   funny feeling in muscles  need to pace / restlessness   severe nausea, vomiting  diarrhea      PAIN MEDICATION POSSIBLE DRUG INTERACTIONS:  NSAIDs                                     AVOID USE  buprofen  oxaprozin (Daypro)  diclofenac (Arthrotec, Voltaren)  indomethacin (Indocin)  ketoprofen  (Orudis)  ketorolac (Toradol)  meloxicam (Mobic)  nabumetone (Relafen)  naproxen  piroxicam ( Feldene)  tolmetin (Tolectin)  celecoxib   fenoprofen  OK TO USE  sulindac (Clinoril)  aspirin  salsalate (Disalcid)  acetominophen    All can raise Li level very high (>50%), very fast     at variable dosing--even with prn use.     They inhibit renal  "clearance of lithium.          Clinical Action:    Avoid combining if possible but if adding lithium to a     scheduled NSAID regimen, monitor weekly for the    first few weeks.                              These 4 drugs do NOT raise Li level    sulindac can decrease level by 50% initially,   then normalize     Major Treatments, Procedures and Findings:  You were provided with: a psychiatric assessment,   Phone:  [phone] / [phone] for medical stability, medication evaluation and/or management, group therapy and milieu management    Symptoms to Report: feeling more aggressive, increased confusion, losing more sleep, mood getting worse or thoughts of suicide    Early warning signs can include: increased depression or anxiety sleep disturbances increased thoughts or behaviors of suicide or self-harm  increased unusual thinking, such as paranoia or hearing voices    Safety and Wellness:  Take all medicines as directed.  Make no changes unless your doctor suggests them.      Follow treatment recommendations.  Refrain from alcohol and non-prescribed drugs.  Ask your support system to help you reduce your access to items that could harm yourself or others. If there is a concern for safety, call 911.    Resources:   [facility] Crisis: ?627.124.4035  Crisis Intervention: 171.351.7963 or 663-245-3802 (TTY: 894.502.4119).  Call anytime for help.  [employer] [place] [employer]ness (www.[place].maki.org): 832.569.2316 or 642-505-2142.  Mental Health Consumer/Survivor Network of [place] (www.mhcsn.net): 196.549.4074 or 305-542-7408  Mental [employer] Association of [place] (www.mentalhealth.org): 687.517.7727 or 374-518-4759  Text 4 Life: txt \"LIFE\" to 93254 for immediate support and crisis intervention  Crisis text line: Text \"[place]\" to 313797. Free, confidential, 24/7.  Crisis Intervention: 606.489.2428 or 493-942-2328. Call anytime for help.     Lifestyle Adjustment:   1. Adjust your lifestyle to get enough " sleep, relaxation, exercise and good nutrition.  Continue to develop healthy coping skills to decrease stress and promote a healthy  lifestyle.  2. Abstain from all substances of abuse.  3. Take medications as prescribed.  Please work with your doctor to discuss any concerns you have with your medications or side effects you may be experiencing.  4. Follow up with appointments as scheduled.      General Medication Instructions:   1. See your medication sheet(s) for instructions.   2. Take all medicines as directed.  Make no changes unless your doctor suggests them.   3. Go to all your doctor visits.  4. Be sure to have all your required lab tests. This way, your medicines can be refilled on time.  5. Do not use any drugs not prescribed by your doctor.    The treatment team has appreciated the opportunity to work with you.     Mathew  please take care and make your recovery a daily recovery.   If you have any questions or concerns our unit number is [phone]    If you would like to obtain any specific documentation regarding your hospitalization after your discharge, contact [facility] Release of Information/Medical Records:  [phone]

## 2019-06-24 NOTE — PROGRESS NOTES
06/24/19 1400   Occupational Therapy   Type of Intervention structured groups   Response Initiates, socially acceptable   Hours 2

## 2019-06-25 NOTE — PROGRESS NOTES
06/24/19 1700   Group Therapy Session   Group Attendance attended group session   Total Time (minutes) 45   Group Type psychotherapeutic   Group Topic Covered coping skills/lifestyle management   Patient Participation/Contribution cooperative with task;discussed personal experience with topic;offered helpful suggestions to peers   Patient participated in psychotherapy group which included a mindfulness activity focusing on the 5 senses and then processing as a group. Mathew was actively engaged in group. He listened thoughtfully as others shared and demonstrated insight as he processed his responses. Mood pleasant, affect congruent.

## 2019-06-28 DIAGNOSIS — F31.81 BIPOLAR 2 DISORDER (H): ICD-10-CM

## 2019-06-28 LAB — LITHIUM SERPL-SCNC: 0.65 MMOL/L (ref 0.6–1.2)

## 2019-06-28 PROCEDURE — 80178 ASSAY OF LITHIUM: CPT | Performed by: PSYCHIATRY & NEUROLOGY

## 2019-06-28 PROCEDURE — 36415 COLL VENOUS BLD VENIPUNCTURE: CPT | Performed by: PSYCHIATRY & NEUROLOGY

## 2021-12-06 NOTE — PROGRESS NOTES
"   06/15/19 1453   Behavioral Health   Hallucinations auditory   Thinking delusional;other (see comment)   Orientation date: oriented;place: oriented;person: oriented   Memory baseline memory   Insight insight appropriate to situation;insight appropriate to events   Judgement impaired   Eye Contact at examiner   Affect other (see comments)   Mood mood is calm   Hygiene well groomed   1. Wish to be Dead No   2. Non-Specific Active Suicidal Thoughts  No   Activities of Daily Living   Hygiene/Grooming independent   Oral Hygiene independent   Dress scrubs (behavioral health)   Laundry unable to complete   Room Organization independent   Patient told staff that he was \"experiancing racing negative thoughts\" and that he really wants to figure out what is going on with him. He said he has been to multiple doctors and they can't seem to figure it out. His goal of the day is to talk to his hammad, and figure out is short term disability. He was over all calm and social. Denies SI/SIB  " 12/06/21 1100   Discharge disposition   Expected discharge disposition Home-Health   Post Acute Care Provider   (Jeff García)   Discharge transportation Private car     Met with patient and his wife and daughter at the bedside to discuss disc
